# Patient Record
Sex: FEMALE | Race: ASIAN | NOT HISPANIC OR LATINO | URBAN - METROPOLITAN AREA
[De-identification: names, ages, dates, MRNs, and addresses within clinical notes are randomized per-mention and may not be internally consistent; named-entity substitution may affect disease eponyms.]

---

## 2020-02-24 ENCOUNTER — HOSPITAL ENCOUNTER (OUTPATIENT)
Dept: ULTRASOUND IMAGING | Facility: HOSPITAL | Age: 55
Discharge: HOME | End: 2020-02-24
Attending: OBSTETRICS & GYNECOLOGY

## 2024-01-15 ENCOUNTER — HOSPITAL ENCOUNTER (EMERGENCY)
Facility: HOSPITAL | Age: 59
Discharge: HOME | End: 2024-01-15
Attending: EMERGENCY MEDICINE | Admitting: EMERGENCY MEDICINE
Payer: OTHER GOVERNMENT

## 2024-01-15 ENCOUNTER — APPOINTMENT (OUTPATIENT)
Dept: GENERAL RADIOLOGY | Facility: HOSPITAL | Age: 59
End: 2024-01-15
Payer: OTHER GOVERNMENT

## 2024-01-15 ENCOUNTER — APPOINTMENT (OUTPATIENT)
Dept: CT IMAGING | Facility: HOSPITAL | Age: 59
End: 2024-01-15
Payer: OTHER GOVERNMENT

## 2024-01-15 VITALS
OXYGEN SATURATION: 98 % | SYSTOLIC BLOOD PRESSURE: 147 MMHG | RESPIRATION RATE: 16 BRPM | WEIGHT: 126.76 LBS | DIASTOLIC BLOOD PRESSURE: 110 MMHG | HEIGHT: 61 IN | TEMPERATURE: 97.2 F | BODY MASS INDEX: 23.93 KG/M2 | HEART RATE: 91 BPM

## 2024-01-15 DIAGNOSIS — R07.9 NONSPECIFIC CHEST PAIN: ICD-10-CM

## 2024-01-15 DIAGNOSIS — R06.00 DYSPNEA, UNSPECIFIED TYPE: Primary | ICD-10-CM

## 2024-01-15 LAB
ALBUMIN SERPL-MCNC: 4.5 G/DL (ref 3.5–5.2)
ALBUMIN/GLOB SERPL: 1.7 G/DL
ALP SERPL-CCNC: 94 U/L (ref 39–117)
ALT SERPL W P-5'-P-CCNC: 17 U/L (ref 1–33)
ANION GAP SERPL CALCULATED.3IONS-SCNC: 14.4 MMOL/L (ref 5–15)
AST SERPL-CCNC: 20 U/L (ref 1–32)
BASOPHILS # BLD AUTO: 0.06 10*3/MM3 (ref 0–0.2)
BASOPHILS NFR BLD AUTO: 0.9 % (ref 0–1.5)
BILIRUB SERPL-MCNC: 0.6 MG/DL (ref 0–1.2)
BUN SERPL-MCNC: 7 MG/DL (ref 6–20)
BUN/CREAT SERPL: 8.9 (ref 7–25)
CALCIUM SPEC-SCNC: 9.8 MG/DL (ref 8.6–10.5)
CHLORIDE SERPL-SCNC: 100 MMOL/L (ref 98–107)
CO2 SERPL-SCNC: 24.6 MMOL/L (ref 22–29)
CREAT SERPL-MCNC: 0.79 MG/DL (ref 0.57–1)
DEPRECATED RDW RBC AUTO: 37.9 FL (ref 37–54)
EGFRCR SERPLBLD CKD-EPI 2021: 86.3 ML/MIN/1.73
EOSINOPHIL # BLD AUTO: 0.06 10*3/MM3 (ref 0–0.4)
EOSINOPHIL NFR BLD AUTO: 0.9 % (ref 0.3–6.2)
ERYTHROCYTE [DISTWIDTH] IN BLOOD BY AUTOMATED COUNT: 12 % (ref 12.3–15.4)
FLUAV SUBTYP SPEC NAA+PROBE: NOT DETECTED
FLUBV RNA ISLT QL NAA+PROBE: NOT DETECTED
GLOBULIN UR ELPH-MCNC: 2.6 GM/DL
GLUCOSE SERPL-MCNC: 147 MG/DL (ref 65–99)
HCT VFR BLD AUTO: 43.5 % (ref 34–46.6)
HGB BLD-MCNC: 14.8 G/DL (ref 12–15.9)
HOLD SPECIMEN: NORMAL
HOLD SPECIMEN: NORMAL
IMM GRANULOCYTES # BLD AUTO: 0.01 10*3/MM3 (ref 0–0.05)
IMM GRANULOCYTES NFR BLD AUTO: 0.1 % (ref 0–0.5)
LYMPHOCYTES # BLD AUTO: 1.54 10*3/MM3 (ref 0.7–3.1)
LYMPHOCYTES NFR BLD AUTO: 22 % (ref 19.6–45.3)
MCH RBC QN AUTO: 29.4 PG (ref 26.6–33)
MCHC RBC AUTO-ENTMCNC: 34 G/DL (ref 31.5–35.7)
MCV RBC AUTO: 86.3 FL (ref 79–97)
MONOCYTES # BLD AUTO: 0.47 10*3/MM3 (ref 0.1–0.9)
MONOCYTES NFR BLD AUTO: 6.7 % (ref 5–12)
NEUTROPHILS NFR BLD AUTO: 4.87 10*3/MM3 (ref 1.7–7)
NEUTROPHILS NFR BLD AUTO: 69.4 % (ref 42.7–76)
NRBC BLD AUTO-RTO: 0 /100 WBC (ref 0–0.2)
NT-PROBNP SERPL-MCNC: 40.3 PG/ML (ref 0–900)
PLATELET # BLD AUTO: 248 10*3/MM3 (ref 140–450)
PMV BLD AUTO: 10.4 FL (ref 6–12)
POTASSIUM SERPL-SCNC: 4.1 MMOL/L (ref 3.5–5.2)
PROT SERPL-MCNC: 7.1 G/DL (ref 6–8.5)
RBC # BLD AUTO: 5.04 10*6/MM3 (ref 3.77–5.28)
RSV RNA NPH QL NAA+NON-PROBE: NOT DETECTED
SARS-COV-2 RNA RESP QL NAA+PROBE: NOT DETECTED
SODIUM SERPL-SCNC: 139 MMOL/L (ref 136–145)
TROPONIN T SERPL HS-MCNC: <6 NG/L
WBC NRBC COR # BLD AUTO: 7.01 10*3/MM3 (ref 3.4–10.8)
WHOLE BLOOD HOLD COAG: NORMAL
WHOLE BLOOD HOLD SPECIMEN: NORMAL

## 2024-01-15 PROCEDURE — 36415 COLL VENOUS BLD VENIPUNCTURE: CPT | Performed by: EMERGENCY MEDICINE

## 2024-01-15 PROCEDURE — 84484 ASSAY OF TROPONIN QUANT: CPT | Performed by: EMERGENCY MEDICINE

## 2024-01-15 PROCEDURE — 93005 ELECTROCARDIOGRAM TRACING: CPT | Performed by: EMERGENCY MEDICINE

## 2024-01-15 PROCEDURE — 99285 EMERGENCY DEPT VISIT HI MDM: CPT

## 2024-01-15 PROCEDURE — 93005 ELECTROCARDIOGRAM TRACING: CPT

## 2024-01-15 PROCEDURE — 25510000001 IOPAMIDOL PER 1 ML: Performed by: EMERGENCY MEDICINE

## 2024-01-15 PROCEDURE — 80053 COMPREHEN METABOLIC PANEL: CPT | Performed by: EMERGENCY MEDICINE

## 2024-01-15 PROCEDURE — 71260 CT THORAX DX C+: CPT

## 2024-01-15 PROCEDURE — 83880 ASSAY OF NATRIURETIC PEPTIDE: CPT | Performed by: EMERGENCY MEDICINE

## 2024-01-15 PROCEDURE — 71045 X-RAY EXAM CHEST 1 VIEW: CPT

## 2024-01-15 PROCEDURE — 87637 SARSCOV2&INF A&B&RSV AMP PRB: CPT | Performed by: EMERGENCY MEDICINE

## 2024-01-15 PROCEDURE — 85025 COMPLETE CBC W/AUTO DIFF WBC: CPT | Performed by: EMERGENCY MEDICINE

## 2024-01-15 RX ORDER — SODIUM CHLORIDE 0.9 % (FLUSH) 0.9 %
10 SYRINGE (ML) INJECTION AS NEEDED
Status: DISCONTINUED | OUTPATIENT
Start: 2024-01-15 | End: 2024-01-15 | Stop reason: HOSPADM

## 2024-01-15 RX ADMIN — IOPAMIDOL 45 ML: 755 INJECTION, SOLUTION INTRAVENOUS at 19:22

## 2024-01-15 NOTE — ED PROVIDER NOTES
"Time: 6:22 PM EST  Date of encounter:  1/15/2024  Independent Historian/Clinical History and Information was obtained by:   Patient    History is limited by: N/A    Chief Complaint: Chest pain, difficulty breathing      History of Present Illness:  Patient is a 58 y.o. year old female who presents to the emergency department for evaluation of intermittent chest pain and difficulty breathing for the past 1 year.  Patient presents today as the symptoms seem to be worse.  She describes the pain as bilateral chest \"needles\".  Complains of shortness of breath.  Afebrile.  In the last few days she has noted cough/congestion.  Former smoker.    HPI    Patient Care Team  Primary Care Provider: Provider, No Known    Past Medical History:     Allergies   Allergen Reactions    Bactrim [Sulfamethoxazole-Trimethoprim] Unknown - Low Severity    Benadryl [Diphenhydramine] Unknown - Low Severity     History reviewed. No pertinent past medical history.  History reviewed. No pertinent surgical history.  History reviewed. No pertinent family history.    Home Medications:  Prior to Admission medications    Not on File        Social History:          Review of Systems:  Review of Systems   Constitutional:  Negative for chills and fever.   HENT:  Negative for congestion, rhinorrhea and sore throat.    Eyes:  Negative for photophobia.   Respiratory:  Positive for shortness of breath. Negative for apnea, cough and chest tightness.    Cardiovascular:  Positive for chest pain. Negative for palpitations.   Gastrointestinal:  Negative for abdominal pain, diarrhea, nausea and vomiting.   Endocrine: Negative.    Genitourinary:  Negative for difficulty urinating and dysuria.   Musculoskeletal:  Negative for back pain, joint swelling and myalgias.   Skin:  Negative for color change and wound.   Allergic/Immunologic: Negative.    Neurological:  Negative for seizures and headaches.   Psychiatric/Behavioral: Negative.     All other systems reviewed " "and are negative.       Physical Exam:  BP (!) 148/103 (BP Location: Left arm, Patient Position: Sitting)   Pulse 91   Temp 97.2 °F (36.2 °C) (Oral)   Resp 18   Ht 154.9 cm (61\")   Wt 57.5 kg (126 lb 12.2 oz)   LMP  (LMP Unknown)   SpO2 96%   BMI 23.95 kg/m²     Physical Exam  Vitals and nursing note reviewed.   Constitutional:       General: She is awake.      Appearance: Normal appearance. She is well-developed.   HENT:      Head: Normocephalic and atraumatic.      Nose: Nose normal.      Mouth/Throat:      Mouth: Mucous membranes are moist.   Eyes:      Extraocular Movements: Extraocular movements intact.      Pupils: Pupils are equal, round, and reactive to light.   Cardiovascular:      Rate and Rhythm: Normal rate and regular rhythm.      Heart sounds: Normal heart sounds.   Pulmonary:      Effort: Pulmonary effort is normal. No respiratory distress.      Breath sounds: Normal breath sounds. No wheezing, rhonchi or rales.   Abdominal:      General: Bowel sounds are normal.      Palpations: Abdomen is soft.      Tenderness: There is no abdominal tenderness. There is no guarding or rebound.      Comments: No rigidity   Musculoskeletal:         General: No tenderness. Normal range of motion.      Cervical back: Normal range of motion and neck supple.   Skin:     General: Skin is warm and dry.      Coloration: Skin is not jaundiced.   Neurological:      General: No focal deficit present.      Mental Status: She is alert. Mental status is at baseline.   Psychiatric:         Mood and Affect: Mood normal.                  Procedures:  Procedures      Medical Decision Making:      Comorbidities that affect care:    None    External Notes reviewed:    None      The following orders were placed and all results were independently analyzed by me:  Orders Placed This Encounter   Procedures    COVID-19, FLU A/B, RSV PCR 1 HR TAT - Swab, Nasopharynx    XR Chest 1 View    CT Chest With Contrast Diagnostic    [place] " Draw    Comprehensive Metabolic Panel    BNP    Single High Sensitivity Troponin T    CBC Auto Differential    Ambulatory Referral to Family Practice    NPO Diet NPO Type: Strict NPO    Undress & Gown    Continuous Pulse Oximetry    Vital Signs    Oxygen Therapy- Nasal Cannula; Titrate 1-6 LPM Per SpO2; 90 - 95%    ECG 12 Lead ED Triage Standing Order; SOA    Insert Peripheral IV    CBC & Differential    Green Top (Gel)    Lavender Top    Gold Top - SST    Light Blue Top       Medications Given in the Emergency Department:  Medications   sodium chloride 0.9 % flush 10 mL (has no administration in time range)   iopamidol (ISOVUE-370) 76 % injection 100 mL (45 mL Intravenous Given 1/15/24 1922)        ED Course:    ED Course as of 01/15/24 2047   Mon Che[place] 15, 2024   1820 I have personally interpreted the EKG today and it shows no evidence of any acute ischemia or heart arrhythmia. [RP]      ED Course User Index  [RP] Wesley Pickering MD       Labs:    Lab Results (last 24 hours)       Procedure Component Value Units Date/Time    COVID-19, FLU A/B, RSV PCR 1 HR TAT - Swab, Nasopharynx [[id number]]  (Normal) Collected: 01/15/24 1657    Specimen: Swab from Nasopharynx Updated: 01/15/24 1804     COVID19 Not Detected     Influenza A PCR Not Detected     Influenza B PCR Not Detected     RSV, PCR Not Detected    Narrative:      Fact sheet for providers: https://www.fda.gov/media/037016/download    Fact sheet for patients: https://www.fda.gov/media/721222/download    Test performed by PCR.    CBC & Differential [[id number]]  (Abnormal) Collected: 01/15/24 1701    Specimen: Blood from Arm, Right Updated: 01/15/24 1728    Narrative:      The following orders were created for panel order CBC & Differential.  Procedure                               Abnormality         Status                     ---------                               -----------         ------                     CBC Auto Differential[[id number]]        Abnormal             Final result                 Please view results for these tests on the individual orders.    Comprehensive Metabolic Panel [[id number]]  (Abnormal) Collected: 01/15/24 1701    Specimen: Blood from Arm, Right Updated: 01/15/24 1757     Glucose 147 mg/dL      BUN 7 mg/dL      Creatinine 0.79 mg/dL      Sodium 139 mmol/L      Potassium 4.1 mmol/L      Chloride 100 mmol/L      CO2 24.6 mmol/L      Calcium 9.8 mg/dL      Total Protein 7.1 g/dL      Albumin 4.5 g/dL      ALT (SGPT) 17 U/L      AST (SGOT) 20 U/L      Alkaline Phosphatase 94 U/L      Total Bilirubin 0.6 mg/dL      Globulin 2.6 gm/dL      A/G Ratio 1.7 g/dL      BUN/Creatinine Ratio 8.9     Anion Gap 14.4 mmol/L      eGFR 86.3 mL/min/1.73     Narrative:      GFR Normal >60  Chronic Kidney Disease <60  Kidney Failure <15      BNP [[id number]]  (Normal) Collected: 01/15/24 1701    Specimen: Blood from Arm, Right Updated: 01/15/24 1740     proBNP 40.3 pg/mL     Narrative:      This assay is used as an aid in the diagnosis of individuals suspected of having heart failure. It can be used as an aid in the diagnosis of acute decompensated heart failure (ADHF) in patients presenting with signs and symptoms of ADHF to the emergency department (ED). In addition, NT-proBNP of <300 pg/mL indicates ADHF is not likely.    Age Range Result Interpretation  NT-proBNP Concentration (pg/mL:      <50             Positive            >450                   Gray                 300-450                    Negative             <300    50-75           Positive            >900                  Gray                300-900                  Negative            <300      >75             Positive            >1800                  Gray                300-1800                  Negative            <300    Single High Sensitivity Troponin T [[id number]]  (Normal) Collected: 01/15/24 1701    Specimen: Blood from Arm, Right Updated: 01/15/24 1745     HS Troponin T <6 ng/L     Narrative:       High Sensitive Troponin T Reference Range:  <14.0 ng/L- Negative Female for AMI  <22.0 ng/L- Negative Male for AMI  >=14 - Abnormal Female indicating possible myocardial injury.  >=22 - Abnormal Male indicating possible myocardial injury.   Clinicians would have to utilize clinical acumen, EKG, Troponin, and serial changes to determine if it is an Acute Myocardial Infarction or myocardial injury due to an underlying chronic condition.         CBC Auto Differential [[id number]]  (Abnormal) Collected: 01/15/24 1701    Specimen: Blood from Arm, Right Updated: 01/15/24 1728     WBC 7.01 10*3/mm3      RBC 5.04 10*6/mm3      Hemoglobin 14.8 g/dL      Hematocrit 43.5 %      MCV 86.3 fL      MCH 29.4 pg      MCHC 34.0 g/dL      RDW 12.0 %      RDW-SD 37.9 fl      MPV 10.4 fL      Platelets 248 10*3/mm3      Neutrophil % 69.4 %      Lymphocyte % 22.0 %      Monocyte % 6.7 %      Eosinophil % 0.9 %      Basophil % 0.9 %      Immature Grans % 0.1 %      Neutrophils, Absolute 4.87 10*3/mm3      Lymphocytes, Absolute 1.54 10*3/mm3      Monocytes, Absolute 0.47 10*3/mm3      Eosinophils, Absolute 0.06 10*3/mm3      Basophils, Absolute 0.06 10*3/mm3      Immature Grans, Absolute 0.01 10*3/mm3      nRBC 0.0 /100 WBC              Imaging:    CT Chest With Contrast Diagnostic    Result Date: 1/15/2024  PROCEDURE: CT CHEST W CONTRAST DIAGNOSTIC  COMPARISON:  [facility], CT, ABDOMEN/PELVIS WITH CONTRAST, 1/09/2020, 2:59. INDICATIONS: PE protocol. Chest pain, SOA  TECHNIQUE: After obtaining the patient's consent, CT images were obtained with non-ionic intravenous contrast material.   PROTOCOL:   Standard imaging protocol performed    RADIATION:   DLP: 259.5mGy*cm   Automated exposure control was utilized to minimize radiation dose. CONTRAST: 45cc Isovue 370 I.V.  FINDINGS:  Pulmonary arteries:  Adequately opacified.  No emboli demonstrated  Candace/mediastinum:  Thoracic aorta normal in caliber.  Minimal coronary  calcification.  No pericardial effusion  Lungs/pleura:  No acute infiltrate or edema.  Mild atelectasis in the lung bases.  No pleural effusion  Upper abdomen:  No acute abnormality.  Hepatic and left renal cysts  Bones/soft tissues:  No acute bony abnormality       No evidence of pulmonary embolism.  No acute cardiopulmonary process demonstrated     GWENDOLYN MATA MD       Electronically Signed and Approved By: GWENDOLYN MATA MD on 1/15/2024 at 19:33             XR Chest 1 View    Result Date: 1/15/2024  PROCEDURE: XR CHEST 1 VW  COMPARISON: [facility], , CHEST AP/PA 1 VIEW, 1/09/2020, 0:49.  INDICATIONS: SOA Triage Protocol  non smoker short of breath  FINDINGS:  No focal or diffuse infiltrate is identified. No pleural effusion or pneumothorax. Heart size and mediastinal contour appear within normal limits.         No radiographic findings of acute cardiopulmonary abnormality.       LOC VELEZ MD       Electronically Signed and Approved By: LOC VELEZ MD on 1/15/2024 at 17:38                Differential Diagnosis and Discussion:    Chest Pain:  Based on the patient's signs and symptoms, I considered aortic dissection, myocardial infaction, pulmonary embolism, cardiac tamponade, pericarditis, pneumothorax, musculoskeletal chest pain and other differential diagnosis as an etiology of the patient's chest pain.   Dyspnea: Differential diagnosis includes but is not limited to metabolic acidosis, neurological disorders, psychogenic, asthma, pneumothorax, upper airway obstruction, COPD, pneumonia, noncardiogenic pulmonary edema, interstitial lung disease, anemia, congestive heart failure, and pulmonary embolism    All labs were reviewed and interpreted by me.  All X-rays impressions were independently interpreted by me.  EKG was interpreted by me.  CT scan radiology impression was interpreted by me.    MDM     Amount and/or Complexity of Data Reviewed  Clinical lab tests: reviewed  Tests in the  radiology section of CPT®: reviewed  Tests in the medicine section of CPT®: reviewed                 Patient Care Considerations:    CONSULT: I considered consulting cardiology, however EKG is nonischemic and troponin is negative      Consultants/Shared Management Plan:    None    Social Determinants of Health:    Patient is independent, reliable, and has access to care.       Disposition and Care Coordination:    Discharged: I considered escalation of care by admitting this patient to the hospital, however the patient has improved and is suitable and stable for discharge.    I have explained the patient´s condition, diagnoses and treatment plan based on the information available to me at this time. I have answered questions and addressed any concerns. The patient has a good  understanding of the patient´s diagnosis, condition, and treatment plan as can be expected at this point. The vital signs have been stable. The patient´s condition is stable and appropriate for discharge from the emergency department.      The patient will pursue further outpatient evaluation with the primary care physician or other designated or consulting physician as outlined in the discharge instructions. They are agreeable to this plan of care and follow-up instructions have been explained in detail. The patient has received these instructions in written format and have expressed an understanding of the discharge instructions. The patient is aware that any significant change in condition or worsening of symptoms should prompt an immediate return to this or the closest emergency department or call to 911.    Final diagnoses:   Dyspnea, unspecified type   Nonspecific chest pain        ED Disposition       ED Disposition   Discharge    Condition   Stable    Comment   --               This medical record created using voice recognition software.             Wesley Pickering MD  01/15/24 1545

## 2024-01-16 LAB
QT INTERVAL: 364 MS
QTC INTERVAL: 444 MS

## 2024-11-28 ENCOUNTER — HOSPITAL ENCOUNTER (EMERGENCY)
Facility: HOSPITAL | Age: 59
Discharge: HOME | End: 2024-11-28
Attending: EMERGENCY MEDICINE
Payer: OTHER GOVERNMENT

## 2024-11-28 ENCOUNTER — APPOINTMENT (OUTPATIENT)
Dept: GENERAL RADIOLOGY | Facility: HOSPITAL | Age: 59
End: 2024-11-28
Payer: OTHER GOVERNMENT

## 2024-11-28 VITALS
TEMPERATURE: 98.1 F | OXYGEN SATURATION: 99 % | BODY MASS INDEX: 20.73 KG/M2 | RESPIRATION RATE: 16 BRPM | HEIGHT: 60 IN | WEIGHT: 105.6 LBS | HEART RATE: 84 BPM | SYSTOLIC BLOOD PRESSURE: 149 MMHG | DIASTOLIC BLOOD PRESSURE: 95 MMHG

## 2024-11-28 DIAGNOSIS — R09.A2 GLOBUS SENSATION: Primary | ICD-10-CM

## 2024-11-28 LAB
HOLD SPECIMEN: NORMAL
HOLD SPECIMEN: NORMAL
WHOLE BLOOD HOLD COAG: NORMAL
WHOLE BLOOD HOLD SPECIMEN: NORMAL

## 2024-11-28 PROCEDURE — 99283 EMERGENCY DEPT VISIT LOW MDM: CPT

## 2024-11-28 PROCEDURE — 96374 THER/PROPH/DIAG INJ IV PUSH: CPT

## 2024-11-28 PROCEDURE — 25010000002 GLUCAGON (RDNA) PER 1 MG

## 2024-11-28 PROCEDURE — 70360 X-RAY EXAM OF NECK: CPT

## 2024-11-28 RX ORDER — SODIUM CHLORIDE 0.9 % (FLUSH) 0.9 %
10 SYRINGE (ML) INJECTION AS NEEDED
Status: DISCONTINUED | OUTPATIENT
Start: 2024-11-28 | End: 2024-11-28 | Stop reason: HOSPADM

## 2024-11-28 RX ORDER — IBUPROFEN 600 MG/1
1 TABLET ORAL ONCE
Status: COMPLETED | OUTPATIENT
Start: 2024-11-28 | End: 2024-11-28

## 2024-11-28 RX ADMIN — Medication 1 MG: at 17:40

## 2024-11-28 NOTE — DISCHARGE INSTRUCTIONS
X-rays negative  You are able to continue eating and drinking without vomiting, this means that there is not an obstruction.     I have put a referral for GI.  Their office will call you to schedule follow-up appointment

## 2024-11-28 NOTE — ED PROVIDER NOTES
"Time: 5:28 PM EST  Date of encounter:  11/28/2024  Independent Historian/Clinical History and Information was obtained by:   Patient    History is limited by: N/A    Chief Complaint   Patient presents with    FOOD BOLUS         History of Present Illness:  Patient is a 59 y.o. year old female who presents to the emergency department for evaluation of like something is stuck in her throat.  Patient states she was eating some vegetables during Thanksgiving dinner and feels like it might possibly be stuck on the left side of her throat.  She was able to finish the meal and drink fluids without vomiting.  She tried to make herself vomit but states she was unsuccessful.  Denies difficulty swallowing, nausea, vomiting or shortness of breath.    Patient Care Team  Primary Care Provider: Provider, No Known    Past Medical History:     Allergies   Allergen Reactions    Bactrim [Sulfamethoxazole-Trimethoprim] Unknown - Low Severity    Benadryl [Diphenhydramine] Unknown - Low Severity     History reviewed. No pertinent past medical history.  History reviewed. No pertinent surgical history.  History reviewed. No pertinent family history.    Home Medications:  Prior to Admission medications    Not on File        Social History:          Review of Systems:  Review of Systems   Constitutional: Negative.    HENT: Negative.  Negative for sore throat and trouble swallowing.    Eyes: Negative.    Respiratory: Negative.  Negative for shortness of breath and stridor.    Cardiovascular: Negative.    Gastrointestinal: Negative.    Endocrine: Negative.    Genitourinary: Negative.    Musculoskeletal: Negative.    Skin: Negative.    Allergic/Immunologic: Negative.    Neurological: Negative.    Hematological: Negative.    Psychiatric/Behavioral: Negative.          Physical Exam:  /95   Pulse 84   Temp 98.1 °F (36.7 °C) (Oral)   Resp 16   Ht 152.4 cm (60\")   Wt 47.9 kg (105 lb 9.6 oz)   SpO2 99%   BMI 20.62 kg/m²         Physical " Exam  Vitals and nursing note reviewed.   Constitutional:       Appearance: Normal appearance.   HENT:      Head: Normocephalic and atraumatic.      Nose: Nose normal.      Mouth/Throat:      Mouth: Mucous membranes are moist.      Pharynx: No oropharyngeal exudate or posterior oropharyngeal erythema.   Eyes:      Extraocular Movements: Extraocular movements intact.      Conjunctiva/sclera: Conjunctivae normal.      Pupils: Pupils are equal, round, and reactive to light.   Cardiovascular:      Rate and Rhythm: Normal rate and regular rhythm.      Heart sounds: Normal heart sounds.   Pulmonary:      Effort: Pulmonary effort is normal.      Breath sounds: Normal breath sounds.   Musculoskeletal:         General: Normal range of motion.      Cervical back: Normal range of motion and neck supple.   Skin:     General: Skin is warm and dry.   Neurological:      General: No focal deficit present.      Mental Status: She is alert and oriented to person, place, and time.   Psychiatric:         Mood and Affect: Mood normal.         Behavior: Behavior normal.                  Procedures:  Procedures      Medical Decision Making:      Comorbidities that affect care:    None    External Notes reviewed:    Previous ED Note: [facility] ED visit 1/15/2024      The following orders were placed and all results were independently analyzed by me:  Orders Placed This Encounter   Procedures    XR Neck Soft Tissue    [place] Draw    Ambulatory Referral to Gastroenterology    Insert peripheral IV    Green Top (Gel)    Lavender Top    Gold Top - SST    Light Blue Top       Medications Given in the Emergency Department:  Medications   sodium chloride 0.9 % flush 10 mL (has no administration in time range)   glucagon (GLUCAGEN) injection 1 mg (1 mg Intravenous Given 11/28/24 1740)        ED Course:    The patient was initially evaluated in the triage area where orders were placed. The patient was later dispositioned by Ayaka Vergara  NOAH.      The patient was advised to stay for completion of workup which includes but is not limited to communication of labs and radiological results, reassessment and plan. The patient was advised that leaving prior to disposition by a provider could result in critical findings that are not communicated to the patient.     ED Course as of 11/28/24 1831   Thu Nov 28, 2024   1828 X-ray negative  Patient drink a whole cup of water with no nausea or vomiting.  Will give her referral to GI for EGD. [AJ]      ED Course User Index  [AJ] Ayaka Vergara PA-C       Labs:    Lab Results (last 24 hours)       ** No results found for the last 24 hours. **             Imaging:    XR Neck Soft Tissue    Result Date: 11/28/2024  XR NECK SOFT TISSUE Date of Exam: 11/28/2024 5:45 PM EST Indication: feels like something in throat Comparison: None available. Findings: There is no evidence of tonsillar hypertrophy. No evidence of retained foreign body within the airway proximal esophagus. No prevertebral soft tissue edema. Straightening of cervical lordosis with mild degenerative changes. No acute bone abnormality. Dental amalgam.     Impression: No radiographic evidence of retained foreign body within the airway or proximal esophagus. Electronically Signed: Immanuel Murcia MD  11/28/2024 6:00 PM EST  Workstation ID: [id number]       Differential Diagnosis and Discussion:      Dysphagia  Sore Throat: Differential diagnosis includes but is not limited to bacterial infection, viral infection, inhaled irritants, sinus drainage, thyroiditis, epiglottitis, and retropharyngeal abscess.    All X-rays impressions were independently interpreted by me.    MDM                   Patient Care Considerations:    CONSULT: I considered consulting GI, however patient can continue to eat and drink without vomiting      Consultants/Shared Management Plan:    None    Social Determinants of Health:    Patient is independent, reliable, and has access to  care.       Disposition and Care Coordination:    Discharged: The patient is suitable and stable for discharge with no need for consideration of admission.    I have explained the patient´s condition, diagnoses and treatment plan based on the information available to me at this time. I have answered questions and addressed any concerns. The patient has a good  understanding of the patient´s diagnosis, condition, and treatment plan as can be expected at this point. The vital signs have been stable. The patient´s condition is stable and appropriate for discharge from the emergency department.      The patient will pursue further outpatient evaluation with the primary care physician or other designated or consulting physician as outlined in the discharge instructions. They are agreeable to this plan of care and follow-up instructions have been explained in detail. The patient has received these instructions in written format and has expressed an understanding of the discharge instructions. The patient is aware that any significant change in condition or worsening of symptoms should prompt an immediate return to this or the closest emergency department or call to 911.    Final diagnoses:   Globus sensation        ED Disposition       ED Disposition   Discharge    Condition   Stable    Comment   --               This medical record created using voice recognition software.             Ayaka Vergara PA-C  11/28/24 4882

## 2025-01-10 ENCOUNTER — OFFICE VISIT (OUTPATIENT)
Dept: FAMILY MEDICINE CLINIC | Facility: CLINIC | Age: 60
End: 2025-01-10
Payer: OTHER GOVERNMENT

## 2025-01-10 VITALS
HEART RATE: 80 BPM | WEIGHT: 111 LBS | TEMPERATURE: 97.3 F | OXYGEN SATURATION: 98 % | BODY MASS INDEX: 21.79 KG/M2 | HEIGHT: 60 IN

## 2025-01-10 DIAGNOSIS — Z12.31 SCREENING MAMMOGRAM FOR BREAST CANCER: ICD-10-CM

## 2025-01-10 DIAGNOSIS — Z23 NEED FOR TDAP VACCINATION: ICD-10-CM

## 2025-01-10 DIAGNOSIS — R09.A2 GLOBUS SENSATION: ICD-10-CM

## 2025-01-10 DIAGNOSIS — Z00.00 ENCOUNTER FOR MEDICAL EXAMINATION TO ESTABLISH CARE: Primary | ICD-10-CM

## 2025-01-10 DIAGNOSIS — I10 HYPERTENSION, UNSPECIFIED TYPE: ICD-10-CM

## 2025-01-10 DIAGNOSIS — M19.041 PRIMARY OSTEOARTHRITIS OF RIGHT HAND: ICD-10-CM

## 2025-01-10 PROCEDURE — 99204 OFFICE O/P NEW MOD 45 MIN: CPT | Performed by: STUDENT IN AN ORGANIZED HEALTH CARE EDUCATION/TRAINING PROGRAM

## 2025-01-10 RX ORDER — AMLODIPINE BESYLATE 5 MG/1
5 TABLET ORAL DAILY
Qty: 30 TABLET | Refills: 5 | Status: SHIPPED | OUTPATIENT
Start: 2025-01-10

## 2025-01-10 NOTE — PROGRESS NOTES
"Chief Complaint  Establish Care and Hospital Follow Up Visit (11/28/24 choked on vegetable at thanksgiving)    Arun Seo presents to [facility]  History of Present Illness      History of Present Illness  The patient presents for evaluation of globus sensation, hypertension, arthritis, and health maintenance.    She experienced a single episode of globus sensation while consuming vegetables on Thanksgiving, which necessitated an emergency room visit. She reports no current issues with food impaction during meals. The incident involved an object lodged in her throat, causing a laceration that she was unable to dislodge. This resulted in a temporary inability to breathe due to the tightness, which eventually resolved. She has no history of tonsillectomy or any other surgical procedures. A referral to a gastroenterologist was made by the emergency room physician, but she has not yet scheduled an appointment.    She is not currently on any antihypertensive medications. She reports no headaches or chest pain.    She has a history of smoking for 25 years, which she ceased in 2000. Her last mammogram was conducted during her  service, and she has not had one in the past two years. A benign finding was noted in her right breast. She has not received a tetanus vaccine.    She reports intermittent sharp pain in her fingers.    SOCIAL HISTORY  The patient has a 25-year history of smoking and quit approximately 25 years ago.    IMMUNIZATIONS  She is due for a tetanus vaccine.      No current outpatient medications    The following portions of the patient's history were reviewed and updated as appropriate: allergies, current medications, past family history, past medical history, past social history, past surgical history, and problem list.    Objective   Vital Signs:   Pulse 80   Temp 97.3 °F (36.3 °C)   Ht 152.4 cm (60\")   Wt 50.3 kg (111 lb)   SpO2 98%   " BMI 21.68 kg/m²     BP Readings from Last 3 Encounters:   11/28/24 149/95   01/15/24 (!) 147/110     Wt Readings from Last 3 Encounters:   01/10/25 50.3 kg (111 lb)   11/28/24 47.9 kg (105 lb 9.6 oz)   01/15/24 57.5 kg (126 lb 12.2 oz)     BMI is within normal parameters. No other follow-up for BMI required.     Physical Exam  Constitutional:       Appearance: Normal appearance.   HENT:      Head: Normocephalic.      Nose: Nose normal.      Mouth/Throat:      Mouth: Mucous membranes are moist.   Eyes:      Pupils: Pupils are equal, round, and reactive to light.   Cardiovascular:      Rate and Rhythm: Normal rate and regular rhythm.   Pulmonary:      Effort: Pulmonary effort is normal.   Abdominal:      General: Abdomen is flat.   Musculoskeletal:         General: Normal range of motion.      Cervical back: Normal range of motion.   Skin:     General: Skin is warm and dry.   Neurological:      General: No focal deficit present.      Mental Status: She is alert.   Psychiatric:         Mood and Affect: Mood normal.         Thought Content: Thought content normal.            Result Review :   The following data was reviewed by: Gina Hargrove MD on 01/10/2025:           Lab Results   Component Value Date    COVID19 Not Detected 01/15/2024    RSV Not Detected 01/15/2024       Procedures        Assessment and Plan    There are no diagnoses linked to this encounter.      Assessment & Plan  1. Globus sensation.  She reported a sensation of something stuck in her throat after eating vegetables, which led to an ER visit. The sensation has since resolved. No imaging was done in the ER. A referral to a gastroenterologist will be initiated for further evaluation and to potentially schedule an EGD.    2. Hypertension.  Her blood pressure readings have consistently been elevated, both during her recent ER visit and prior to that. A low-dose antihypertensive medication will be prescribed and sent to [facility]. She is advised to  acquire a home blood pressure monitor and record her readings daily before taking her medication. These readings should be documented for review during her next visit.    3. Arthritis.  She has Heberden's nodes on her fingers, indicative of arthritis. She experiences occasional sharp pain. An as-needed medication for pain management will be prescribed and sent to [facility].    4. Health maintenance.  A mammogram will be ordered, as she has not had one in the last two years. She is advised to get a tetanus shot during her next visit.    Follow-up  The patient will follow up in 1 month for lab work and blood pressure assessment.      There are no discontinued medications.       Follow Up   No follow-ups on file.  Patient was given instructions and counseling regarding her condition or for health maintenance advice. Please see specific information pulled into the AVS if appropriate.       Gina Hargrove MD  01/10/25  08:06 EST      Patient or patient representative verbalized consent for the use of Ambient Listening during the visit with  Gina Hargrove MD for chart documentation. 1/10/2025  08:07 EST

## 2025-02-10 DIAGNOSIS — M19.041 PRIMARY OSTEOARTHRITIS OF RIGHT HAND: ICD-10-CM

## 2025-02-10 NOTE — TELEPHONE ENCOUNTER
Rx Refill Note  Requested Prescriptions     Pending Prescriptions Disp Refills    diclofenac (VOLTAREN) 50 MG EC tablet 60 tablet 0     Sig: Take 1 tablet by mouth 2 (Two) Times a Day As Needed (as neded for pain) for up to 30 days.      Last office visit with prescribing clinician: 1/10/2025   Last telemedicine visit with prescribing clinician: Visit date not found   Next office visit with prescribing clinician: 2/13/2025                         Would you like a call back once the refill request has been completed: [] Yes [] No    If the office needs to give you a call back, can they leave a voicemail: [] Yes [] No    Margaret Sorensen Rep  02/10/25, 11:03 EST

## 2025-02-13 ENCOUNTER — OFFICE VISIT (OUTPATIENT)
Dept: FAMILY MEDICINE CLINIC | Facility: CLINIC | Age: 60
End: 2025-02-13
Payer: OTHER GOVERNMENT

## 2025-02-13 VITALS
OXYGEN SATURATION: 98 % | HEART RATE: 91 BPM | HEIGHT: 60 IN | SYSTOLIC BLOOD PRESSURE: 130 MMHG | DIASTOLIC BLOOD PRESSURE: 78 MMHG | WEIGHT: 107 LBS | BODY MASS INDEX: 21.01 KG/M2 | TEMPERATURE: 97 F

## 2025-02-13 DIAGNOSIS — Z00.00 ANNUAL PHYSICAL EXAM: Primary | ICD-10-CM

## 2025-02-13 DIAGNOSIS — Z23 NEED FOR VACCINATION WITH 20-POLYVALENT PNEUMOCOCCAL CONJUGATE VACCINE: ICD-10-CM

## 2025-02-13 DIAGNOSIS — Z11.59 NEED FOR HEPATITIS C SCREENING TEST: ICD-10-CM

## 2025-02-13 DIAGNOSIS — Z23 NEED FOR VACCINATION: ICD-10-CM

## 2025-02-13 DIAGNOSIS — Z12.11 COLON CANCER SCREENING: ICD-10-CM

## 2025-02-13 DIAGNOSIS — I10 HYPERTENSION, UNSPECIFIED TYPE: ICD-10-CM

## 2025-02-13 DIAGNOSIS — Z23 NEED FOR TDAP VACCINATION: ICD-10-CM

## 2025-02-13 LAB
ALBUMIN SERPL-MCNC: 4.1 G/DL (ref 3.5–5.2)
ALBUMIN/GLOB SERPL: 1.6 G/DL
ALP SERPL-CCNC: 73 U/L (ref 39–117)
ALT SERPL W P-5'-P-CCNC: 13 U/L (ref 1–33)
ANION GAP SERPL CALCULATED.3IONS-SCNC: 11.7 MMOL/L (ref 5–15)
AST SERPL-CCNC: 21 U/L (ref 1–32)
BASOPHILS # BLD AUTO: 0.05 10*3/MM3 (ref 0–0.2)
BASOPHILS NFR BLD AUTO: 1.1 % (ref 0–1.5)
BILIRUB SERPL-MCNC: 0.5 MG/DL (ref 0–1.2)
BUN SERPL-MCNC: 14 MG/DL (ref 8–23)
BUN/CREAT SERPL: 17.7 (ref 7–25)
CALCIUM SPEC-SCNC: 9.4 MG/DL (ref 8.6–10.5)
CHLORIDE SERPL-SCNC: 101 MMOL/L (ref 98–107)
CHOLEST SERPL-MCNC: 248 MG/DL (ref 0–200)
CO2 SERPL-SCNC: 24.3 MMOL/L (ref 22–29)
CREAT SERPL-MCNC: 0.79 MG/DL (ref 0.57–1)
DEPRECATED RDW RBC AUTO: 37.3 FL (ref 37–54)
EGFRCR SERPLBLD CKD-EPI 2021: 85.8 ML/MIN/1.73
EOSINOPHIL # BLD AUTO: 0.07 10*3/MM3 (ref 0–0.4)
EOSINOPHIL NFR BLD AUTO: 1.6 % (ref 0.3–6.2)
ERYTHROCYTE [DISTWIDTH] IN BLOOD BY AUTOMATED COUNT: 11.8 % (ref 12.3–15.4)
GLOBULIN UR ELPH-MCNC: 2.5 GM/DL
GLUCOSE SERPL-MCNC: 239 MG/DL (ref 65–99)
HCT VFR BLD AUTO: 42.1 % (ref 34–46.6)
HDLC SERPL-MCNC: 60 MG/DL (ref 40–60)
HGB BLD-MCNC: 14.3 G/DL (ref 12–15.9)
IMM GRANULOCYTES # BLD AUTO: 0.01 10*3/MM3 (ref 0–0.05)
IMM GRANULOCYTES NFR BLD AUTO: 0.2 % (ref 0–0.5)
LDLC SERPL CALC-MCNC: 150 MG/DL (ref 0–100)
LDLC/HDLC SERPL: 2.43 {RATIO}
LYMPHOCYTES # BLD AUTO: 1.73 10*3/MM3 (ref 0.7–3.1)
LYMPHOCYTES NFR BLD AUTO: 38.5 % (ref 19.6–45.3)
MCH RBC QN AUTO: 29.8 PG (ref 26.6–33)
MCHC RBC AUTO-ENTMCNC: 34 G/DL (ref 31.5–35.7)
MCV RBC AUTO: 87.7 FL (ref 79–97)
MONOCYTES # BLD AUTO: 0.29 10*3/MM3 (ref 0.1–0.9)
MONOCYTES NFR BLD AUTO: 6.5 % (ref 5–12)
NEUTROPHILS NFR BLD AUTO: 2.34 10*3/MM3 (ref 1.7–7)
NEUTROPHILS NFR BLD AUTO: 52.1 % (ref 42.7–76)
NRBC BLD AUTO-RTO: 0 /100 WBC (ref 0–0.2)
PLATELET # BLD AUTO: 246 10*3/MM3 (ref 140–450)
PMV BLD AUTO: 11.2 FL (ref 6–12)
POTASSIUM SERPL-SCNC: 3.8 MMOL/L (ref 3.5–5.2)
PROT SERPL-MCNC: 6.6 G/DL (ref 6–8.5)
RBC # BLD AUTO: 4.8 10*6/MM3 (ref 3.77–5.28)
SODIUM SERPL-SCNC: 137 MMOL/L (ref 136–145)
TRIGL SERPL-MCNC: 210 MG/DL (ref 0–150)
TSH SERPL DL<=0.05 MIU/L-ACNC: 1.17 UIU/ML (ref 0.27–4.2)
VLDLC SERPL-MCNC: 38 MG/DL (ref 5–40)
WBC NRBC COR # BLD AUTO: 4.49 10*3/MM3 (ref 3.4–10.8)

## 2025-02-13 PROCEDURE — 99396 PREV VISIT EST AGE 40-64: CPT | Performed by: STUDENT IN AN ORGANIZED HEALTH CARE EDUCATION/TRAINING PROGRAM

## 2025-02-13 PROCEDURE — 36415 COLL VENOUS BLD VENIPUNCTURE: CPT | Performed by: STUDENT IN AN ORGANIZED HEALTH CARE EDUCATION/TRAINING PROGRAM

## 2025-02-13 PROCEDURE — 80053 COMPREHEN METABOLIC PANEL: CPT | Performed by: STUDENT IN AN ORGANIZED HEALTH CARE EDUCATION/TRAINING PROGRAM

## 2025-02-13 PROCEDURE — 90471 IMMUNIZATION ADMIN: CPT | Performed by: STUDENT IN AN ORGANIZED HEALTH CARE EDUCATION/TRAINING PROGRAM

## 2025-02-13 PROCEDURE — 85025 COMPLETE CBC W/AUTO DIFF WBC: CPT | Performed by: STUDENT IN AN ORGANIZED HEALTH CARE EDUCATION/TRAINING PROGRAM

## 2025-02-13 PROCEDURE — 90472 IMMUNIZATION ADMIN EACH ADD: CPT | Performed by: STUDENT IN AN ORGANIZED HEALTH CARE EDUCATION/TRAINING PROGRAM

## 2025-02-13 PROCEDURE — 84443 ASSAY THYROID STIM HORMONE: CPT | Performed by: STUDENT IN AN ORGANIZED HEALTH CARE EDUCATION/TRAINING PROGRAM

## 2025-02-13 PROCEDURE — 90715 TDAP VACCINE 7 YRS/> IM: CPT | Performed by: STUDENT IN AN ORGANIZED HEALTH CARE EDUCATION/TRAINING PROGRAM

## 2025-02-13 PROCEDURE — 80061 LIPID PANEL: CPT | Performed by: STUDENT IN AN ORGANIZED HEALTH CARE EDUCATION/TRAINING PROGRAM

## 2025-02-13 PROCEDURE — 90677 PCV20 VACCINE IM: CPT | Performed by: STUDENT IN AN ORGANIZED HEALTH CARE EDUCATION/TRAINING PROGRAM

## 2025-02-13 NOTE — PROGRESS NOTES
Chief Complaint  Annual Exam    Arun Seo presents to [facility]  Hypertension  This is a new problem. The current episode started 1 to 4 weeks ago. The problem has been improved since onset. The problem is controlled. Pertinent negatives include no anxiety, blurred vision, chest pain, headaches, malaise/fatigue, orthopnea, palpitations, peripheral edema or shortness of breath. There are no associated agents to hypertension. There are no known risk factors for coronary artery disease. There are no compliance problems.          History of Present Illness  The patient presents for an annual physical exam.    She has been diligently monitoring her blood pressure as per the previous instructions. She has observed a correlation between her dietary intake, particularly salt, and her blood pressure readings. She reports no current pain or discomfort. Her medication regimen includes amlodipine for hypertension management.    She has expressed a preference for colonoscopy over Cologuard for colon cancer screening, citing her lack of prior experience with the former. She has a scheduled mammogram appointment on 03/03/2025.    She has received both the pneumonia and tetanus vaccines today.    Supplemental Information  She is on Voltaren for arthritis pain, which she takes as needed.    SOCIAL HISTORY  The patient smoked for about 4 to 5 months approximately 40 years ago.    MEDICATIONS  Amlodipine, Voltaren (as needed).    IMMUNIZATIONS  She received the pneumonia vaccine and the tetanus vaccine today.      Current Outpatient Medications   Medication Instructions    amLODIPine (NORVASC) 5 mg, Oral, Daily    diclofenac (VOLTAREN) 50 mg, Oral, 2 Times Daily PRN       The following portions of the patient's history were reviewed and updated as appropriate: allergies, current medications, past family history, past medical history, past social history, past surgical  "history, and problem list.    Objective   Vital Signs:   /78   Pulse 91   Temp 97 °F (36.1 °C)   Ht 152.4 cm (60\")   Wt 48.5 kg (107 lb)   SpO2 98%   BMI 20.90 kg/m²     BP Readings from Last 3 Encounters:   02/13/25 130/78   11/28/24 149/95   01/15/24 (!) 147/110     Wt Readings from Last 3 Encounters:   02/13/25 48.5 kg (107 lb)   01/10/25 50.3 kg (111 lb)   11/28/24 47.9 kg (105 lb 9.6 oz)     BMI is within normal parameters. No other follow-up for BMI required.     Physical Exam  Constitutional:       Appearance: Normal appearance.   HENT:      Head: Normocephalic.      Nose: Nose normal.      Mouth/Throat:      Mouth: Mucous membranes are moist.   Eyes:      Pupils: Pupils are equal, round, and reactive to light.   Cardiovascular:      Rate and Rhythm: Normal rate and regular rhythm.   Pulmonary:      Effort: Pulmonary effort is normal.   Abdominal:      General: Abdomen is flat.   Musculoskeletal:         General: Normal range of motion.      Cervical back: Normal range of motion.   Skin:     General: Skin is warm and dry.   Neurological:      General: No focal deficit present.      Mental Status: She is alert.   Psychiatric:         Mood and Affect: Mood normal.         Thought Content: Thought content normal.            Result Review :   The following data was reviewed by: Gina Hargrove MD on 02/13/2025:           Lab Results   Component Value Date    COVID19 Not Detected 01/15/2024    RSV Not Detected 01/15/2024       Procedures        Assessment and Plan    Diagnoses and all orders for this visit:    1. Annual physical exam (Primary)  -     Comprehensive Metabolic Panel  -     CBC & Differential  -     TSH  -     Lipid Panel    2. Need for hepatitis C screening test  -     Hepatitis C Antibody; Future    3. Need for vaccination    4. Hypertension, unspecified type    5. Need for vaccination with 20-polyvalent pneumococcal conjugate vaccine  -     Pneumococcal Conjugate Vaccine 20-Valent " All    6. Need for Tdap vaccination  -     Tdap Vaccine => 8yo IM (BOOSTRIX/ADACEL)    7. Colon cancer screening  -     Ambulatory Referral For Screening Colonoscopy          Assessment & Plan  1. Annual physical examination.  Her blood pressure readings have been consistently within the range of 110 to 120 systolic, with today's highest reading being 130/78, which is commendable. She has been advised to discontinue regular blood pressure monitoring and only record readings when experiencing symptoms such as headaches. A comprehensive blood work will be conducted today. She has expressed a preference for colonoscopy over Cologuard for colon cancer screening. An appointment for a mammogram has been scheduled for 03/03/2025.    2. Hypertension.  She is currently taking amlodipine. Her blood pressure log shows readings between 110 and 120 systolic, with today's highest being 130/78. She has been advised to discontinue regular blood pressure monitoring and only record readings when experiencing symptoms such as headaches.    3. Health maintenance.  She received both the pneumonia and tetanus vaccines today. A comprehensive blood work will be conducted today. She has expressed a preference for colonoscopy over Cologuard for colon cancer screening. An appointment for a mammogram has been scheduled for 03/03/2025.    Follow-up  The patient will follow up in 3 months.      There are no discontinued medications.       Follow Up   Return in about 3 months (around 5/13/2025).  Patient was given instructions and counseling regarding her condition or for health maintenance advice. Please see specific information pulled into the AVS if appropriate.       Gina Hargrove MD  02/13/25  08:23 EST      Patient or patient representative verbalized consent for the use of Ambient Listening during the visit with  Gina Hargrove MD for chart documentation. 2/13/2025  07:48 EST

## 2025-02-20 ENCOUNTER — OFFICE VISIT (OUTPATIENT)
Dept: FAMILY MEDICINE CLINIC | Facility: CLINIC | Age: 60
End: 2025-02-20
Payer: OTHER GOVERNMENT

## 2025-02-20 VITALS
DIASTOLIC BLOOD PRESSURE: 78 MMHG | WEIGHT: 111 LBS | OXYGEN SATURATION: 96 % | TEMPERATURE: 97.8 F | HEART RATE: 69 BPM | SYSTOLIC BLOOD PRESSURE: 118 MMHG | HEIGHT: 60 IN | BODY MASS INDEX: 21.79 KG/M2

## 2025-02-20 DIAGNOSIS — E11.9 TYPE 2 DIABETES MELLITUS WITHOUT COMPLICATION, WITHOUT LONG-TERM CURRENT USE OF INSULIN: ICD-10-CM

## 2025-02-20 DIAGNOSIS — M62.838 NECK MUSCLE SPASM: ICD-10-CM

## 2025-02-20 DIAGNOSIS — E78.2 MIXED HYPERLIPIDEMIA: ICD-10-CM

## 2025-02-20 DIAGNOSIS — Z09 FOLLOW-UP EXAM: Primary | ICD-10-CM

## 2025-02-20 DIAGNOSIS — I10 ESSENTIAL HYPERTENSION: ICD-10-CM

## 2025-02-20 LAB
ALBUMIN UR-MCNC: <1.2 MG/DL
CREAT UR-MCNC: 11 MG/DL
HBA1C MFR BLD: 6.8 % (ref 4.8–5.6)
MICROALBUMIN/CREAT UR: NORMAL MG/G{CREAT}

## 2025-02-20 PROCEDURE — 83036 HEMOGLOBIN GLYCOSYLATED A1C: CPT | Performed by: NURSE PRACTITIONER

## 2025-02-20 PROCEDURE — 82570 ASSAY OF URINE CREATININE: CPT | Performed by: NURSE PRACTITIONER

## 2025-02-20 PROCEDURE — 36415 COLL VENOUS BLD VENIPUNCTURE: CPT | Performed by: NURSE PRACTITIONER

## 2025-02-20 PROCEDURE — 99214 OFFICE O/P EST MOD 30 MIN: CPT | Performed by: NURSE PRACTITIONER

## 2025-02-20 PROCEDURE — 82043 UR ALBUMIN QUANTITATIVE: CPT | Performed by: NURSE PRACTITIONER

## 2025-02-20 RX ORDER — PEN NEEDLE, DIABETIC 31 GX5/16"
1 NEEDLE, DISPOSABLE MISCELLANEOUS 2 TIMES DAILY
Qty: 180 EACH | Refills: 2 | Status: SHIPPED | OUTPATIENT
Start: 2025-02-20 | End: 2025-05-21

## 2025-02-20 RX ORDER — TIZANIDINE 2 MG/1
2 TABLET ORAL NIGHTLY PRN
Qty: 30 TABLET | Refills: 1 | Status: SHIPPED | OUTPATIENT
Start: 2025-02-20

## 2025-02-20 RX ORDER — ATORVASTATIN CALCIUM 20 MG/1
20 TABLET, FILM COATED ORAL DAILY
Qty: 30 TABLET | Refills: 3 | Status: SHIPPED | OUTPATIENT
Start: 2025-02-20 | End: 2025-02-20

## 2025-02-20 RX ORDER — BLOOD-GLUCOSE METER
1 KIT MISCELLANEOUS AS NEEDED
Qty: 1 EACH | Refills: 0 | Status: SHIPPED | OUTPATIENT
Start: 2025-02-20 | End: 2027-11-16

## 2025-02-20 RX ORDER — LANCETS 28 GAUGE
1 EACH MISCELLANEOUS 2 TIMES DAILY
Qty: 200 EACH | Refills: 1 | Status: SHIPPED | OUTPATIENT
Start: 2025-02-20

## 2025-02-20 RX ORDER — ROSUVASTATIN CALCIUM 20 MG/1
20 TABLET, COATED ORAL DAILY
Qty: 90 TABLET | Refills: 1 | Status: SHIPPED | OUTPATIENT
Start: 2025-02-20

## 2025-02-20 NOTE — PROGRESS NOTES
Chief Complaint  Results (LABWORK)    Subjective        Medical History: has a past medical history of Allergic.     Surgical History: has a past surgical history that includes Subtotal Hysterectomy.     Family History: family history is not on file.     Social History: reports that she quit smoking about 21 years ago. Her smoking use included cigarettes. She has a 6.3 pack-year smoking history. She has never used smokeless tobacco. She reports that she does not currently use alcohol. She reports that she does not use drugs.    Cherelle Seo presents to [facility]    History of Present Illness    History of Present Illness  The patient presents for evaluation of diabetes, hypercholesterolemia, hypertension, dizziness, vomiting, and muscle pain.    She has been informed of her elevated blood glucose levels via [employer] but has no prior history of diabetes. She reports an increase in urinary frequency and expresses concern about the potential need for lifelong medication. Her typical diet includes peanut butter and jelly sandwiches for breakfast, soup, rice, and vegetables for lunch, and rice and green tea for dinner. She primarily consumes water throughout the day, occasionally indulging in Gatorade, although she does not always finish the bottle. She also mentions that she frequently dines at other people's homes, where she consumes high-calorie foods such as noodles. She is currently on antihypertensive medication, which she takes at 3:00 PM daily. She recently refilled her prescription for this medication.    She experiences occasional dizziness upon standing from a lying position, which she attributes to her diet. She also reports infrequent episodes of vomiting, during which she is unable to retain food.    Additionally, she reports experiencing headaches, which she believes may be stress-related.    MEDICATIONS  Current: Blood pressure medicine      Objective   Vital Signs:  "  /78   Pulse 69   Temp 97.8 °F (36.6 °C)   Ht 152.4 cm (60\")   Wt 50.3 kg (111 lb)   SpO2 96%   BMI 21.68 kg/m²       Wt Readings from Last 3 Encounters:   02/20/25 50.3 kg (111 lb)   02/13/25 48.5 kg (107 lb)   01/10/25 50.3 kg (111 lb)        BP Readings from Last 3 Encounters:   02/20/25 118/78   02/13/25 130/78   11/28/24 149/95        BMI is within normal parameters. No other follow-up for BMI required.       Physical Exam  Vitals reviewed.   Constitutional:       General: She is not in acute distress.     Appearance: Normal appearance. She is well-developed and normal weight. She is not ill-appearing.   HENT:      Head: Normocephalic and atraumatic.      Mouth/Throat:      Pharynx: No oropharyngeal exudate.   Eyes:      Conjunctiva/sclera: Conjunctivae normal.      Pupils: Pupils are equal, round, and reactive to light.   Cardiovascular:      Rate and Rhythm: Normal rate and regular rhythm.      Heart sounds: No murmur heard.  Pulmonary:      Effort: Pulmonary effort is normal.      Breath sounds: Normal breath sounds. No wheezing.   Skin:     General: Skin is warm and dry.   Neurological:      Mental Status: She is alert and oriented to person, place, and time.      Cranial Nerves: No cranial nerve deficit.   Psychiatric:         Mood and Affect: Mood and affect normal.         Behavior: Behavior normal.         Thought Content: Thought content normal.         Judgment: Judgment normal.        Result Review :  {The following data was reviewed by ALL Pak on 02/20/2025.  Common labs          2/13/2025    08:00   Common Labs   Glucose 239    BUN 14    Creatinine 0.79    Sodium 137    Potassium 3.8    Chloride 101    Calcium 9.4    Albumin 4.1    Total Bilirubin 0.5    Alkaline Phosphatase 73    AST (SGOT) 21    ALT (SGPT) 13    WBC 4.49    Hemoglobin 14.3    Hematocrit 42.1    Platelets 246    Total Cholesterol 248    Triglycerides 210    HDL Cholesterol 60    LDL Cholesterol  150 "      Data reviewed : Previous Dr. De La Cruz note             Current Outpatient Medications on File Prior to Visit   Medication Sig Dispense Refill    amLODIPine (NORVASC) 5 MG tablet Take 1 tablet by mouth Daily. 30 tablet 5    diclofenac (VOLTAREN) 50 MG EC tablet Take 1 tablet by mouth 2 (Two) Times a Day As Needed (as neded for pain) for up to 30 days. (Patient not taking: Reported on 2/20/2025) 60 tablet 0     No current facility-administered medications on file prior to visit.        Assessment and Plan  Diagnoses and all orders for this visit:    1. Follow-up exam (Primary)    2. Essential hypertension  -     Hemoglobin A1c  -     Microalbumin / Creatinine Urine Ratio - Urine, Clean Catch    3. Type 2 diabetes mellitus without complication, without long-term current use of insulin  -     Hemoglobin A1c  -     Alcohol Swabs (Alcohol Prep) pads; Use 1 Pad 2 (Two) Times a Day for 90 days.  Dispense: 180 each; Refill: 2  -     glucose blood test strip; BID fasting and 2hr after largest meal E11.9  Dispense: 180 each; Refill: 2  -     glucose monitor monitoring kit; Use 1 each As Needed (BID). E11.9 BID fasting and 2hr after largest meal  Dispense: 1 each; Refill: 0  -     Lancets 28G Thin misc; Use 1 Device 2 (Two) Times a Day.  Dispense: 200 each; Refill: 1  -     Microalbumin / Creatinine Urine Ratio - Urine, Clean Catch  -     metFORMIN (GLUCOPHAGE) 500 MG tablet; Take 1 tablet by mouth 2 (Two) Times a Day With Meals.  Dispense: 60 tablet; Refill: 3    4. Mixed hyperlipidemia  -     Hemoglobin A1c  -     Microalbumin / Creatinine Urine Ratio - Urine, Clean Catch    5. Neck muscle spasm    Other orders  -     Discontinue: atorvastatin (Lipitor) 20 MG tablet; Take 1 tablet by mouth Daily.  Dispense: 30 tablet; Refill: 3  -     tiZANidine (ZANAFLEX) 2 MG tablet; Take 1 tablet by mouth At Night As Needed for Muscle Spasms.  Dispense: 30 tablet; Refill: 1  -     rosuvastatin (CRESTOR) 20 MG tablet; Take 1 tablet by  mouth Daily.  Dispense: 90 tablet; Refill: 1        Assessment & Plan  1. Diabetes Mellitus.  Her glucose levels have been consistently elevated, necessitating the initiation of pharmacological intervention. The potential side effects of metformin, including gastrointestinal disturbances, were discussed. She was advised to monitor her blood glucose levels at home and maintain a log for future reference. Dietary modifications were also recommended. A prescription for metformin 500 mg, to be taken twice daily, was provided. A glucometer will be sent to her pharmacy for home monitoring. A hemoglobin A1c test will be conducted today to assess her average glucose levels over the past 90 days.    2. Hypercholesterolemia.  Her LDL cholesterol level is significantly elevated at 150 mg/dL. The importance of managing both diabetes and cholesterol to reduce the risk of cardiac events was explained. A prescription for rosuvastatin was provided, with instructions to take it at night. She was advised to avoid foods high in cholesterol and to incorporate more fruits and vegetables into her diet.    3. Hypertension.  Her blood pressure is currently well-controlled with her existing medication regimen. She was advised to continue her current antihypertensive medications and monitor her blood pressure regularly.    4. Dizziness.  She reported experiencing dizziness, particularly when getting up quickly. This could be related to her glucose levels or blood pressure. She was advised to monitor her symptoms and report any significant changes.    5. Vomiting.  She reported occasional episodes of vomiting, which could be related to reflux. She was advised to monitor her symptoms and consider dietary changes to reduce reflux. Medication for reflux can be considered if symptoms persist.    6. Muscle Pain.  She reported muscle pain and expressed interest in trying a muscle relaxer. A prescription for a low-dose muscle relaxer was provided,  with instructions to take it as needed. She was also given stretching exercises to help alleviate the pain.    7. Health Maintenance.  She was advised to get the shingles vaccine, as it is recommended for individuals over the age of 50. She can receive this vaccine at her pharmacy.    Follow-up  The patient will follow up in 1 month.      Follow Up   Return in about 1 month (around 3/20/2025) for Recheck.  Patient was given instructions and counseling regarding her condition or for health maintenance advice. Please see specific information pulled into the AVS if appropriate.       Part of this note may be electronic transcription/translation of spoken language to printed text using the Dragon dictation system    Patient or patient representative verbalized consent for the use of Ambient Listening during the visit with  ALL Pak for chart documentation. 2/20/2025  10:08 EST

## 2025-03-03 ENCOUNTER — HOSPITAL ENCOUNTER (OUTPATIENT)
Dept: MAMMOGRAPHY | Facility: HOSPITAL | Age: 60
Discharge: HOME | End: 2025-03-03
Admitting: STUDENT IN AN ORGANIZED HEALTH CARE EDUCATION/TRAINING PROGRAM
Payer: OTHER GOVERNMENT

## 2025-03-03 DIAGNOSIS — Z12.31 SCREENING MAMMOGRAM FOR BREAST CANCER: ICD-10-CM

## 2025-03-03 PROCEDURE — 77067 SCR MAMMO BI INCL CAD: CPT

## 2025-03-03 PROCEDURE — 77063 BREAST TOMOSYNTHESIS BI: CPT

## 2025-03-25 ENCOUNTER — OFFICE VISIT (OUTPATIENT)
Dept: FAMILY MEDICINE CLINIC | Facility: CLINIC | Age: 60
End: 2025-03-25
Payer: OTHER GOVERNMENT

## 2025-03-25 VITALS
OXYGEN SATURATION: 99 % | WEIGHT: 107 LBS | BODY MASS INDEX: 21.01 KG/M2 | HEART RATE: 71 BPM | TEMPERATURE: 97.1 F | HEIGHT: 60 IN

## 2025-03-25 DIAGNOSIS — Z11.59 NEED FOR HEPATITIS C SCREENING TEST: ICD-10-CM

## 2025-03-25 DIAGNOSIS — R42 DIZZINESS: ICD-10-CM

## 2025-03-25 DIAGNOSIS — Z09 FOLLOW-UP EXAM: ICD-10-CM

## 2025-03-25 DIAGNOSIS — E11.9 TYPE 2 DIABETES MELLITUS WITHOUT COMPLICATION, WITHOUT LONG-TERM CURRENT USE OF INSULIN: Primary | ICD-10-CM

## 2025-03-25 PROBLEM — N85.2 ENLARGED UTERUS: Status: ACTIVE | Noted: 2025-03-25

## 2025-03-25 LAB
ALBUMIN SERPL-MCNC: 4.8 G/DL (ref 3.5–5.2)
ALBUMIN/GLOB SERPL: 1.8 G/DL
ALP SERPL-CCNC: 78 U/L (ref 39–117)
ALT SERPL W P-5'-P-CCNC: 21 U/L (ref 1–33)
ANION GAP SERPL CALCULATED.3IONS-SCNC: 13.3 MMOL/L (ref 5–15)
AST SERPL-CCNC: 29 U/L (ref 1–32)
BASOPHILS # BLD AUTO: 0.04 10*3/MM3 (ref 0–0.2)
BASOPHILS NFR BLD AUTO: 0.6 % (ref 0–1.5)
BILIRUB SERPL-MCNC: 0.5 MG/DL (ref 0–1.2)
BUN SERPL-MCNC: 7 MG/DL (ref 8–23)
BUN/CREAT SERPL: 8.3 (ref 7–25)
CALCIUM SPEC-SCNC: 10.4 MG/DL (ref 8.6–10.5)
CHLORIDE SERPL-SCNC: 102 MMOL/L (ref 98–107)
CO2 SERPL-SCNC: 25.7 MMOL/L (ref 22–29)
CREAT SERPL-MCNC: 0.84 MG/DL (ref 0.57–1)
DEPRECATED RDW RBC AUTO: 38.8 FL (ref 37–54)
EGFRCR SERPLBLD CKD-EPI 2021: 79.7 ML/MIN/1.73
EOSINOPHIL # BLD AUTO: 0.08 10*3/MM3 (ref 0–0.4)
EOSINOPHIL NFR BLD AUTO: 1.3 % (ref 0.3–6.2)
ERYTHROCYTE [DISTWIDTH] IN BLOOD BY AUTOMATED COUNT: 12.2 % (ref 12.3–15.4)
GLOBULIN UR ELPH-MCNC: 2.7 GM/DL
GLUCOSE SERPL-MCNC: 102 MG/DL (ref 65–99)
HBA1C MFR BLD: 6.4 % (ref 4.8–5.6)
HCT VFR BLD AUTO: 44.5 % (ref 34–46.6)
HCV AB SER QL: NORMAL
HGB BLD-MCNC: 15 G/DL (ref 12–15.9)
IMM GRANULOCYTES # BLD AUTO: 0.01 10*3/MM3 (ref 0–0.05)
IMM GRANULOCYTES NFR BLD AUTO: 0.2 % (ref 0–0.5)
LYMPHOCYTES # BLD AUTO: 2 10*3/MM3 (ref 0.7–3.1)
LYMPHOCYTES NFR BLD AUTO: 31.5 % (ref 19.6–45.3)
MCH RBC QN AUTO: 29.6 PG (ref 26.6–33)
MCHC RBC AUTO-ENTMCNC: 33.7 G/DL (ref 31.5–35.7)
MCV RBC AUTO: 87.8 FL (ref 79–97)
MONOCYTES # BLD AUTO: 0.4 10*3/MM3 (ref 0.1–0.9)
MONOCYTES NFR BLD AUTO: 6.3 % (ref 5–12)
NEUTROPHILS NFR BLD AUTO: 3.82 10*3/MM3 (ref 1.7–7)
NEUTROPHILS NFR BLD AUTO: 60.1 % (ref 42.7–76)
NRBC BLD AUTO-RTO: 0 /100 WBC (ref 0–0.2)
PLATELET # BLD AUTO: 251 10*3/MM3 (ref 140–450)
PMV BLD AUTO: 10.8 FL (ref 6–12)
POTASSIUM SERPL-SCNC: 3.6 MMOL/L (ref 3.5–5.2)
PROT SERPL-MCNC: 7.5 G/DL (ref 6–8.5)
RBC # BLD AUTO: 5.07 10*6/MM3 (ref 3.77–5.28)
SODIUM SERPL-SCNC: 141 MMOL/L (ref 136–145)
WBC NRBC COR # BLD AUTO: 6.35 10*3/MM3 (ref 3.4–10.8)

## 2025-03-25 PROCEDURE — 86803 HEPATITIS C AB TEST: CPT | Performed by: STUDENT IN AN ORGANIZED HEALTH CARE EDUCATION/TRAINING PROGRAM

## 2025-03-25 PROCEDURE — 83036 HEMOGLOBIN GLYCOSYLATED A1C: CPT | Performed by: STUDENT IN AN ORGANIZED HEALTH CARE EDUCATION/TRAINING PROGRAM

## 2025-03-25 PROCEDURE — 85025 COMPLETE CBC W/AUTO DIFF WBC: CPT | Performed by: STUDENT IN AN ORGANIZED HEALTH CARE EDUCATION/TRAINING PROGRAM

## 2025-03-25 PROCEDURE — 80053 COMPREHEN METABOLIC PANEL: CPT | Performed by: STUDENT IN AN ORGANIZED HEALTH CARE EDUCATION/TRAINING PROGRAM

## 2025-03-25 RX ORDER — ROSUVASTATIN CALCIUM 10 MG/1
10 TABLET, COATED ORAL NIGHTLY
Qty: 90 TABLET | Refills: 5 | Status: SHIPPED | OUTPATIENT
Start: 2025-03-25

## 2025-03-25 NOTE — PROGRESS NOTES
Chief Complaint  Hyperlipidemia (Pt was started on rosuvastatin sates that she has been gassy since starting on medication she also stated that she has been experiencing dizziness )    Arun          Cherelle Seo presents to [facility]  Hyperlipidemia          History of Present Illness  The patient presents for evaluation of dizziness, diabetes mellitus, and hyperlipidemia.    She reports experiencing intermittent dizziness, which she attributes to her dietary intake. The onset of these symptoms was noted on Monday morning, following a meal consumed on Sunday. She has observed a correlation between her dizziness and elevated blood glucose levels, particularly after consuming rice paper. She also mentions that her blood pressure tends to be more often low than high. She is not experiencing any chest pain or shortness of breath. She recalls two previous episodes of dizziness, one associated with noodle consumption and the other with sushi intake. She describes the dizziness as causing a sensation of slowness but does not report any syncope.    She is currently on metformin for diabetes management, administered twice daily. She has no prior history of diabetes and reports no family history of the disease. She has sufficient test strips for blood glucose monitoring.    She expresses concern about her cholesterol medication, Crestor, which she believes is causing gastrointestinal discomfort. She notes that this side effect is not present when she takes the medication in the morning, but it becomes bothersome when taken at night.    FAMILY HISTORY  She reports no family history of diabetes.    MEDICATIONS  Metformin, Crestor, Norvasc      Current Outpatient Medications   Medication Instructions    Alcohol Swabs (Alcohol Prep) pads 1 Pad, Not Applicable, 2 Times Daily    amLODIPine (NORVASC) 5 mg, Oral, Daily    glucose blood test strip BID fasting and 2hr after largest meal E11.9  "   glucose monitor monitoring kit 1 each, Not Applicable, As Needed, E11.9 BID fasting and 2hr after largest meal    Lancets 28G Thin misc 1 Device, Not Applicable, 2 Times Daily    metFORMIN (GLUCOPHAGE) 500 mg, Oral, 2 Times Daily With Meals    rosuvastatin (CRESTOR) 10 mg, Oral, Nightly    tiZANidine (ZANAFLEX) 2 mg, Oral, Nightly PRN       The following portions of the patient's history were reviewed and updated as appropriate: allergies, current medications, past family history, past medical history, past social history, past surgical history, and problem list.    Objective   Vital Signs:   Pulse 71   Temp 97.1 °F (36.2 °C)   Ht 152.4 cm (60\")   Wt 48.5 kg (107 lb)   SpO2 99%   BMI 20.90 kg/m²     BP Readings from Last 3 Encounters:   02/20/25 118/78   02/13/25 130/78   11/28/24 149/95     Wt Readings from Last 3 Encounters:   03/25/25 48.5 kg (107 lb)   02/20/25 50.3 kg (111 lb)   02/13/25 48.5 kg (107 lb)     BMI is within normal parameters. No other follow-up for BMI required.     Physical Exam  Constitutional:       Appearance: Normal appearance.   HENT:      Head: Normocephalic.      Nose: Nose normal.      Mouth/Throat:      Mouth: Mucous membranes are moist.   Eyes:      Pupils: Pupils are equal, round, and reactive to light.   Cardiovascular:      Rate and Rhythm: Normal rate and regular rhythm.   Pulmonary:      Effort: Pulmonary effort is normal.   Abdominal:      General: Abdomen is flat.   Musculoskeletal:         General: Normal range of motion.      Cervical back: Normal range of motion.   Skin:     General: Skin is warm and dry.   Neurological:      General: No focal deficit present.      Mental Status: She is alert.   Psychiatric:         Mood and Affect: Mood normal.         Thought Content: Thought content normal.              Result Review :   The following data was reviewed by: Gina Hargrove MD on 03/25/2025:  Common labs          2/13/2025    08:00 2/20/2025    08:53   Common Labs "   Glucose 239     BUN 14     Creatinine 0.79     Sodium 137     Potassium 3.8     Chloride 101     Calcium 9.4     Albumin 4.1     Total Bilirubin 0.5     Alkaline Phosphatase 73     AST (SGOT) 21     ALT (SGPT) 13     WBC 4.49     Hemoglobin 14.3     Hematocrit 42.1     Platelets 246     Total Cholesterol 248     Triglycerides 210     HDL Cholesterol 60     LDL Cholesterol  150     Hemoglobin A1C  6.80    Microalbumin, Urine  <1.2             Lab Results   Component Value Date    COVID19 Not Detected 01/15/2024    RSV Not Detected 01/15/2024       Procedures        Assessment and Plan    Diagnoses and all orders for this visit:    1. Type 2 diabetes mellitus without complication, without long-term current use of insulin (Primary)  -     CBC w AUTO Differential; Future  -     Comprehensive metabolic panel; Future  -     Hemoglobin A1c; Future  -     metFORMIN (GLUCOPHAGE) 500 MG tablet; Take 1 tablet by mouth 2 (Two) Times a Day With Meals.  Dispense: 180 tablet; Refill: 3  -     CBC w AUTO Differential  -     Comprehensive metabolic panel  -     Hemoglobin A1c    2. Need for hepatitis C screening test  -     Hepatitis C Antibody; Future  -     Hepatitis C Antibody    3. Follow-up exam    4. Dizziness    Other orders  -     rosuvastatin (CRESTOR) 10 MG tablet; Take 1 tablet by mouth Every Night.  Dispense: 90 tablet; Refill: 5          Assessment & Plan  1. Dizziness.  Her dizziness is likely related to fluctuations in blood glucose levels, which can cause dizziness when levels rise or fall rapidly. Blood work will be conducted today to rule out other potential causes.    2. Diabetes Mellitus.  Her A1c levels are elevated but not excessively high, indicating a need for treatment. She is currently taking metformin twice a day. She is advised to monitor her blood glucose levels before breakfast for the next visit, 5 days a week, and whenever she remembers. If she consumes a large meal the previous day, she should  check her fasting blood glucose levels the following morning.    3. Hyperlipidemia.  Her cholesterol level was recorded at 150 during the last visit, which is significantly high and increases her risk of heart attack or stroke. The dosage of Crestor will be reduced from 20 mg to 10 mg to see if it alleviates her gassiness. If there is no improvement within 2 weeks, she should contact the office, and an alternative medication will be considered.    Follow-up  The patient will follow up in 3 months.      Medications Discontinued During This Encounter   Medication Reason    rosuvastatin (CRESTOR) 20 MG tablet     metFORMIN (GLUCOPHAGE) 500 MG tablet Reorder          Follow Up   Return in about 4 weeks (around 4/22/2025).  Patient was given instructions and counseling regarding her condition or for health maintenance advice. Please see specific information pulled into the AVS if appropriate.       Gina Hargrove MD  03/25/25  10:49 EDT      Patient or patient representative verbalized consent for the use of Ambient Listening during the visit with  Gina Hargrove MD for chart documentation. 3/25/2025  08:00 EDT

## 2025-04-25 ENCOUNTER — OFFICE VISIT (OUTPATIENT)
Dept: GASTROENTEROLOGY | Facility: CLINIC | Age: 60
End: 2025-04-25
Payer: OTHER GOVERNMENT

## 2025-04-25 VITALS
HEIGHT: 60 IN | OXYGEN SATURATION: 100 % | HEART RATE: 77 BPM | WEIGHT: 107.8 LBS | BODY MASS INDEX: 21.17 KG/M2 | DIASTOLIC BLOOD PRESSURE: 75 MMHG | SYSTOLIC BLOOD PRESSURE: 131 MMHG

## 2025-04-25 DIAGNOSIS — Z12.11 ENCOUNTER FOR SCREENING FOR MALIGNANT NEOPLASM OF COLON: ICD-10-CM

## 2025-04-25 DIAGNOSIS — R09.A2 GLOBUS SENSATION: Primary | ICD-10-CM

## 2025-04-25 RX ORDER — PEG-3350, SODIUM SULFATE, SODIUM CHLORIDE, POTASSIUM CHLORIDE, SODIUM ASCORBATE AND ASCORBIC ACID 7.5-2.691G
1000 KIT ORAL EVERY 12 HOURS
Qty: 2000 ML | Refills: 0 | Status: SHIPPED | OUTPATIENT
Start: 2025-04-25

## 2025-04-25 NOTE — PROGRESS NOTES
Chief Complaint  Colon Cancer Screening and Difficulty Swallowing    Cherelle Seo is a 59 y.o. female who presents to [facility] on referral from Gina Hargrove MD for a gastroenterology evaluation of dysphagia and colon cancer screening.        History of Present Illness  New patient presents to the office for dysphagia and colon cancer screening. Previously having dysphagia and globus sensation but this has improved. Denies heartburn, nausea, vomiting, and abdominal pain. Denies change in bowel habits, melena, and hematochezia. Denies unintentional weight loss. Unknown family history.       Past Medical History:   Diagnosis Date    Allergic        Past Surgical History:   Procedure Laterality Date    SUBTOTAL HYSTERECTOMY           Current Outpatient Medications:     Alcohol Swabs (Alcohol Prep) pads, Use 1 Pad 2 (Two) Times a Day for 90 days., Disp: 180 each, Rfl: 2    amLODIPine (NORVASC) 5 MG tablet, Take 1 tablet by mouth Daily., Disp: 30 tablet, Rfl: 5    glucose blood test strip, BID fasting and 2hr after largest meal E11.9, Disp: 180 each, Rfl: 2    glucose monitor monitoring kit, Use 1 each As Needed (BID). E11.9 BID fasting and 2hr after largest meal, Disp: 1 each, Rfl: 0    Lancets 28G Thin misc, Use 1 Device 2 (Two) Times a Day., Disp: 200 each, Rfl: 1    metFORMIN (GLUCOPHAGE) 500 MG tablet, Take 1 tablet by mouth 2 (Two) Times a Day With Meals., Disp: 180 tablet, Rfl: 3    rosuvastatin (CRESTOR) 10 MG tablet, Take 1 tablet by mouth Every Night., Disp: 90 tablet, Rfl: 5    tiZANidine (ZANAFLEX) 2 MG tablet, Take 1 tablet by mouth At Night As Needed for Muscle Spasms., Disp: 30 tablet, Rfl: 1    PEG-KCl-NaCl-NaSulf-Na Asc-C (MoviPrep) 100 g reconstituted solution powder, Take 1,000 mL by mouth Every 12 (Twelve) Hours., Disp: 2000 mL, Rfl: 0     Allergies   Allergen Reactions    Bactrim [Sulfamethoxazole-Trimethoprim] Unknown - Low Severity    Benadryl  "[Diphenhydramine] Unknown - Low Severity       Family History   Problem Relation Age of Onset    Colon cancer Neg Hx         Social History     Social History Narrative    Not on file       Immunization:  Immunization History   Administered Date(s) Administered    Pneumococcal Conjugate 20-Valent (PCV20) 02/13/2025    Tdap 02/13/2025        Objective     Vital Signs:   /75 (BP Location: Left arm, Patient Position: Sitting, Cuff Size: Adult)   Pulse 77   Ht 152.4 cm (60\")   Wt 48.9 kg (107 lb 12.8 oz)   SpO2 100%   BMI 21.05 kg/m²       Physical Exam  Constitutional:       Appearance: Normal appearance. She is normal weight.   HENT:      Head: Normocephalic and atraumatic.      Nose: Nose normal.   Pulmonary:      Effort: Pulmonary effort is normal.   Skin:     General: Skin is warm and dry.   Neurological:      Mental Status: She is alert and oriented to person, place, and time. Mental status is at baseline.   Psychiatric:         Mood and Affect: Mood normal.         Behavior: Behavior normal.         Thought Content: Thought content normal.         Judgment: Judgment normal.         Result Review :     CBC w/diff          2/13/2025    08:00 3/25/2025    08:10   CBC w/Diff   WBC 4.49  6.35    RBC 4.80  5.07    Hemoglobin 14.3  15.0    Hematocrit 42.1  44.5    MCV 87.7  87.8    MCH 29.8  29.6    MCHC 34.0  33.7    RDW 11.8  12.2    Platelets 246  251    Neutrophil Rel % 52.1  60.1    Immature Granulocyte Rel % 0.2  0.2    Lymphocyte Rel % 38.5  31.5    Monocyte Rel % 6.5  6.3    Eosinophil Rel % 1.6  1.3    Basophil Rel % 1.1  0.6      CMP          2/13/2025    08:00 3/25/2025    08:10   CMP   Glucose 239  102    BUN 14  7    Creatinine 0.79  0.84    EGFR 85.8  79.7    Sodium 137  141    Potassium 3.8  3.6    Chloride 101  102    Calcium 9.4  10.4    Total Protein 6.6  7.5    Albumin 4.1  4.8    Globulin 2.5  2.7    Total Bilirubin 0.5  0.5    Alkaline Phosphatase 73  78    AST (SGOT) 21  29    ALT (SGPT) " 13  21    Albumin/Globulin Ratio 1.6  1.8    BUN/Creatinine Ratio 17.7  8.3    Anion Gap 11.7  13.3                    Assessment and Plan    Diagnoses and all orders for this visit:    1. Globus sensation (Primary)  -     Case Request; Standing  -     Implement Anesthesia orders day of procedure.; Standing  -     Follow Anesthesia Guidelines / Protocol; Future  -     Verify NPO; Standing  -     Verify bowel prep was successful; Standing  -     Give tap water enema if bowel prep was insufficient; Standing  -     Case Request    2. Encounter for screening for malignant neoplasm of colon  -     Case Request; Standing  -     Implement Anesthesia orders day of procedure.; Standing  -     Follow Anesthesia Guidelines / Protocol; Future  -     Verify NPO; Standing  -     Verify bowel prep was successful; Standing  -     Give tap water enema if bowel prep was insufficient; Standing  -     Case Request    Other orders  -     PEG-KCl-NaCl-NaSulf-Na Asc-C (MoviPrep) 100 g reconstituted solution powder; Take 1,000 mL by mouth Every 12 (Twelve) Hours.  Dispense: 2000 mL; Refill: 0    Denies cardiopulmonary history  EGD/COLONOSCOPY Surgical Risk and Benefits: Possible risk/complications, benefits, and alternatives to surgical or invasive procedure have been explained to patient and/or legal guardian. Risks include bleeding, infection, and perforation. Patient has been evaluated and can tolerate anesthesia and/or sedation. Risk, benefits, and alternatives to anesthesia and sedation have been explained to patient and/or legal guardian.     Follow Up   No follow-ups on file.  Patient was given instructions and counseling regarding her condition or for health maintenance advice. Please see specific information pulled into the AVS if appropriate.

## 2025-05-27 NOTE — PAT
Reviewed the following with patient.    Arrival time of 1100.    Must have  over 18 for transportation home post procedure. Come to the [facility], entrance C.     Education provided on laxative administration; bowel prep to be taken in two doses. Reviewed diet instructions for day prior to procedure. Only plain, unflavored water after midnight until two hours prior to arrival time.     Do not take any diabetes medications on the day of the procedure. Instead bring all prescribed medication and inhalers to the hospital the morning of the procedure. May take any nebulizer treatments or inhalers the morning of the procedure.     Pt verbalized understanding of instructions.

## 2025-05-30 ENCOUNTER — ANESTHESIA EVENT (OUTPATIENT)
Dept: GASTROENTEROLOGY | Facility: HOSPITAL | Age: 60
End: 2025-05-30
Payer: OTHER GOVERNMENT

## 2025-05-30 RX ORDER — SODIUM CHLORIDE, SODIUM LACTATE, POTASSIUM CHLORIDE, CALCIUM CHLORIDE 600; 310; 30; 20 MG/100ML; MG/100ML; MG/100ML; MG/100ML
30 INJECTION, SOLUTION INTRAVENOUS CONTINUOUS
Status: CANCELLED | OUTPATIENT
Start: 2025-06-02 | End: 2025-06-02

## 2025-05-30 NOTE — ANESTHESIA PREPROCEDURE EVALUATION
Anesthesia Evaluation     Patient summary reviewed   NPO Solid Status: > 8 hours  NPO Liquid Status: > 2 hours           Airway   Mallampati: I  TM distance: >3 FB  Neck ROM: full  No difficulty expected  Dental      Comment: Missing back teeth. Nothing loose       Pulmonary - normal exam   (+) a smoker (quit 2004) Former, cigarettes,  Cardiovascular - normal exam  Exercise tolerance: good (4-7 METS)    ECG reviewed    (+) hypertension well controlled less than 2 medications    ROS comment: HEART RATE= 89  bpm  RR Interval= 672  ms  [place] Interval= 117  ms  P Horizontal Axis= 1  deg  P Front Axis= 39  deg  QRSD Interval= 87  ms  QT Interval= 364  ms  QTcB= 444  ms  QRS Axis= 69  deg  T Wave Axis= -11  deg  - BORDERLINE ECG -  Sinus rhythm  Borderline short [place] interval  Borderline repolarization abnormality  No previous ECG available for comparison  Electronically Signed By: Parish Almaraz ([facility]) 16-Jan-2024 08:51:46  Date and Time of Study: 2024-01-15 17:08:58          Neuro/Psych- negative ROS  GI/Hepatic/Renal/Endo    (+) diabetes mellitus type 2 well controlled    Musculoskeletal (-) negative ROS    Abdominal  - normal exam   Substance History - negative use     OB/GYN negative ob/gyn ROS         Other - negative ROS       ROS/Med Hx Other: Took blood pressure meds this morning     BG 67- 1/2 amp D50 ordered. Will recheck BG after giving.                Anesthesia Plan    ASA 2     general   total IV anesthesia  intravenous induction     Anesthetic plan, risks, benefits, and alternatives have been provided, discussed and informed consent has been obtained with: patient.  Pre-procedure education provided  Plan discussed with CRNA.    CODE STATUS:

## 2025-06-02 ENCOUNTER — HOSPITAL ENCOUNTER (OUTPATIENT)
Facility: HOSPITAL | Age: 60
Setting detail: HOSPITAL OUTPATIENT SURGERY
Discharge: HOME | End: 2025-06-02
Attending: INTERNAL MEDICINE | Admitting: INTERNAL MEDICINE
Payer: OTHER GOVERNMENT

## 2025-06-02 ENCOUNTER — ANESTHESIA (OUTPATIENT)
Dept: GASTROENTEROLOGY | Facility: HOSPITAL | Age: 60
End: 2025-06-02
Payer: OTHER GOVERNMENT

## 2025-06-02 VITALS
WEIGHT: 101.41 LBS | SYSTOLIC BLOOD PRESSURE: 116 MMHG | RESPIRATION RATE: 18 BRPM | HEART RATE: 71 BPM | BODY MASS INDEX: 19.81 KG/M2 | TEMPERATURE: 96.3 F | DIASTOLIC BLOOD PRESSURE: 84 MMHG | OXYGEN SATURATION: 98 %

## 2025-06-02 DIAGNOSIS — R09.A2 GLOBUS SENSATION: ICD-10-CM

## 2025-06-02 DIAGNOSIS — Z12.11 ENCOUNTER FOR SCREENING FOR MALIGNANT NEOPLASM OF COLON: ICD-10-CM

## 2025-06-02 LAB
GLUCOSE BLDC GLUCOMTR-MCNC: 132 MG/DL (ref 70–99)
GLUCOSE BLDC GLUCOMTR-MCNC: 195 MG/DL (ref 70–99)
GLUCOSE BLDC GLUCOMTR-MCNC: 67 MG/DL (ref 70–99)

## 2025-06-02 PROCEDURE — 45385 COLONOSCOPY W/LESION REMOVAL: CPT | Performed by: INTERNAL MEDICINE

## 2025-06-02 PROCEDURE — 88305 TISSUE EXAM BY PATHOLOGIST: CPT | Performed by: INTERNAL MEDICINE

## 2025-06-02 PROCEDURE — 25810000003 LACTATED RINGERS PER 1000 ML: Performed by: NURSE ANESTHETIST, CERTIFIED REGISTERED

## 2025-06-02 PROCEDURE — 43239 EGD BIOPSY SINGLE/MULTIPLE: CPT | Performed by: INTERNAL MEDICINE

## 2025-06-02 PROCEDURE — 82948 REAGENT STRIP/BLOOD GLUCOSE: CPT

## 2025-06-02 PROCEDURE — 25010000002 LIDOCAINE PF 2% 2 % SOLUTION: Performed by: NURSE ANESTHETIST, CERTIFIED REGISTERED

## 2025-06-02 PROCEDURE — 25010000002 PROPOFOL 10 MG/ML EMULSION: Performed by: NURSE ANESTHETIST, CERTIFIED REGISTERED

## 2025-06-02 RX ORDER — PROPOFOL 10 MG/ML
VIAL (ML) INTRAVENOUS AS NEEDED
Status: DISCONTINUED | OUTPATIENT
Start: 2025-06-02 | End: 2025-06-02 | Stop reason: SURG

## 2025-06-02 RX ORDER — DEXTROSE MONOHYDRATE 25 G/50ML
25 INJECTION, SOLUTION INTRAVENOUS AS NEEDED
Status: DISCONTINUED | OUTPATIENT
Start: 2025-06-02 | End: 2025-06-02 | Stop reason: HOSPADM

## 2025-06-02 RX ORDER — SODIUM CHLORIDE, SODIUM LACTATE, POTASSIUM CHLORIDE, CALCIUM CHLORIDE 600; 310; 30; 20 MG/100ML; MG/100ML; MG/100ML; MG/100ML
INJECTION, SOLUTION INTRAVENOUS CONTINUOUS PRN
Status: DISCONTINUED | OUTPATIENT
Start: 2025-06-02 | End: 2025-06-02 | Stop reason: SURG

## 2025-06-02 RX ORDER — DEXTROSE MONOHYDRATE 25 G/50ML
12.5 INJECTION, SOLUTION INTRAVENOUS AS NEEDED
Status: DISCONTINUED | OUTPATIENT
Start: 2025-06-02 | End: 2025-06-02

## 2025-06-02 RX ORDER — LIDOCAINE HYDROCHLORIDE 20 MG/ML
INJECTION, SOLUTION EPIDURAL; INFILTRATION; INTRACAUDAL; PERINEURAL AS NEEDED
Status: DISCONTINUED | OUTPATIENT
Start: 2025-06-02 | End: 2025-06-02 | Stop reason: SURG

## 2025-06-02 RX ADMIN — LIDOCAINE HYDROCHLORIDE 40 MG: 20 INJECTION, SOLUTION INTRAVENOUS at 12:25

## 2025-06-02 RX ADMIN — DEXTROSE MONOHYDRATE 25 G: 25 INJECTION, SOLUTION INTRAVENOUS at 11:46

## 2025-06-02 RX ADMIN — PROPOFOL 200 MCG/KG/MIN: 10 INJECTION, EMULSION INTRAVENOUS at 12:25

## 2025-06-02 RX ADMIN — SODIUM CHLORIDE, POTASSIUM CHLORIDE, SODIUM LACTATE AND CALCIUM CHLORIDE: 600; 310; 30; 20 INJECTION, SOLUTION INTRAVENOUS at 12:25

## 2025-06-02 RX ADMIN — PROPOFOL 50 MG: 10 INJECTION, EMULSION INTRAVENOUS at 12:25

## 2025-06-02 NOTE — ANESTHESIA POSTPROCEDURE EVALUATION
Patient: Cherelle Seo    Procedure Summary       Date: 06/02/25 Room / Location: [facility] / [facility]    Anesthesia Start: 1224 Anesthesia Stop: 1303    Procedures:       ESOPHAGOGASTRODUODENOSCOPY with biopsies      COLONOSCOPY with cold snare polypectomy Diagnosis:       Globus sensation      Encounter for screening for malignant neoplasm of colon      (Globus sensation [R09.A2])      (Encounter for screening for malignant neoplasm of colon [Z12.11])    Surgeons: Roberto Carlos Jimenez MD Provider: Genet Steel CRNA    Anesthesia Type: general ASA Status: 2            Anesthesia Type: general    Vitals  Vitals Value Taken Time   BP 92/66 06/02/25 13:06   Temp 35.7 °C (96.3 °F) 06/02/25 13:00   Pulse 77 06/02/25 13:06   Resp 18 06/02/25 13:00   SpO2 96 % 06/02/25 13:06   Vitals shown include unfiled device data.        Post Anesthesia Care and Evaluation    Patient location during evaluation: bedside  Patient participation: complete - patient participated  Level of consciousness: awake  Pain management: adequate    Airway patency: patent  Anesthetic complications: No anesthetic complications  PONV Status: controlled  Cardiovascular status: acceptable and stable  Respiratory status: acceptable

## 2025-06-02 NOTE — H&P
Pre Procedure History & Physical    Chief Complaint:   Dysphagia, globus sensation, screening dysphagia, globus sensation, screening for colon cancer    Subjective     HPI:   Dysphagia, screening    Past Medical History:   Past Medical History:   Diagnosis Date    Allergic     Arthritis     Diabetes mellitus     Elevated cholesterol     Hypertension        Past Surgical History:  Past Surgical History:   Procedure Laterality Date    SUBTOTAL HYSTERECTOMY         Family History:  Family History   Problem Relation Age of Onset    Colon cancer Neg Hx        Social History:   reports that she quit smoking about 21 years ago. Her smoking use included cigarettes. She has a 6.3 pack-year smoking history. She has been exposed to tobacco smoke. She has never used smokeless tobacco. She reports that she does not currently use alcohol. She reports that she does not use drugs.    Medications:   Medications Prior to Admission   Medication Sig Dispense Refill Last Dose/Taking    amLODIPine (NORVASC) 5 MG tablet Take 1 tablet by mouth Daily. 30 tablet 5 6/2/2025    metFORMIN (GLUCOPHAGE) 500 MG tablet Take 1 tablet by mouth 2 (Two) Times a Day With Meals. 180 tablet 3 6/1/2025    rosuvastatin (CRESTOR) 10 MG tablet Take 1 tablet by mouth Every Night. 90 tablet 5 6/1/2025    glucose blood test strip BID fasting and 2hr after largest meal E11.9 180 each 2     glucose monitor monitoring kit Use 1 each As Needed (BID). E11.9 BID fasting and 2hr after largest meal 1 each 0     Lancets 28G Thin misc Use 1 Device 2 (Two) Times a Day. 200 each 1     tiZANidine (ZANAFLEX) 2 MG tablet Take 1 tablet by mouth At Night As Needed for Muscle Spasms. 30 tablet 1        Allergies:  Bactrim [sulfamethoxazole-trimethoprim] and Benadryl [diphenhydramine]        Objective     Blood pressure 128/92, pulse 87, temperature 98.2 °F (36.8 °C), temperature source Temporal, resp. rate 14, weight 46 kg (101 lb 6.6 oz), SpO2 100%.    Physical Exam    Constitutional: Pt is oriented to person, place, and time and well-developed, well-nourished, and in no distress.   Mouth/Throat: Oropharynx is clear and moist.   Neck: Normal range of motion.   Cardiovascular: Normal rate, regular rhythm and normal heart sounds.    Pulmonary/Chest: Effort normal and breath sounds normal.   Abdominal: Soft. Nontender  Skin: Skin is warm and dry.   Psychiatric: Mood, memory, affect and judgment normal.     Assessment & Plan     Diagnosis:  Dysphagia, globus sensation, colon cancer screening    Anticipated Surgical Procedure:  EGD colonoscopy    The risks, benefits, and alternatives of this procedure have been discussed with the patient or the responsible party- the patient understands and agrees to proceed.

## 2025-06-03 ENCOUNTER — RESULTS FOLLOW-UP (OUTPATIENT)
Dept: GASTROENTEROLOGY | Facility: HOSPITAL | Age: 60
End: 2025-06-03
Payer: OTHER GOVERNMENT

## 2025-06-03 DIAGNOSIS — A04.8 H. PYLORI INFECTION: Primary | ICD-10-CM

## 2025-06-04 LAB
CYTO UR: NORMAL
LAB AP CASE REPORT: NORMAL
LAB AP CLINICAL INFORMATION: NORMAL
PATH REPORT.FINAL DX SPEC: NORMAL
PATH REPORT.GROSS SPEC: NORMAL

## 2025-06-04 RX ORDER — AMOXICILLIN 500 MG/1
1000 CAPSULE ORAL 2 TIMES DAILY
Qty: 56 CAPSULE | Refills: 0 | Status: SHIPPED | OUTPATIENT
Start: 2025-06-04 | End: 2025-06-18

## 2025-06-04 RX ORDER — CLARITHROMYCIN 500 MG/1
500 TABLET ORAL 2 TIMES DAILY
Qty: 28 TABLET | Refills: 0 | Status: SHIPPED | OUTPATIENT
Start: 2025-06-04 | End: 2025-06-18

## 2025-06-04 RX ORDER — PANTOPRAZOLE SODIUM 40 MG/1
40 TABLET, DELAYED RELEASE ORAL DAILY
Qty: 14 TABLET | Refills: 0 | Status: SHIPPED | OUTPATIENT
Start: 2025-06-04 | End: 2025-06-18

## 2025-06-05 NOTE — TELEPHONE ENCOUNTER
Spoke to patient and informed of ALL Caceres result note and recommendations. Verified patient understanding.    Educated on intestinal metaplasia:  metaplasia is the transition of cells into a different type of cell, in this case we see intestinal cells in the esophagus/stomach.  This is a normal cell; however, it is not in its normal place.      When found in the stomach, this is thought to be cause by an injury/trauma to the stomach.  For instance, smoking, alcohol or NSAID use.  We recommend to avoid these things.    Intestinal metaplasia can be associated with an increased risk of cancer.  If the cell becomes abnormal, it is a very slow process of cellular change.  General recommendations to decrease risk of cancer would be smoking cessation, abstain from alcohol, and avoid NSAIDs.    Educated on H. Pylori bacterial infection and importance of completing medication as prescribed.  Provided medication details.     Educated on need for H. Pylori breath test 4 weeks after completion of medication regimen to insure eradication of bacteria.  Reviewed breath test instructions.    H. Pylori education and detailed H. Pylori medication and breath test instructions sent via [employer] and [email or web address].    Advised patient to reach out to our office with any GI needs.    Mailed letter to patient.  Confirmed PCP and faxed letter.    Care Gap updated:  5 year colon recall

## 2025-06-06 NOTE — TELEPHONE ENCOUNTER
Patient called to review medications.  Confirmed she was able to  all three medications today.  Reviewed how best to take.  Patient verbalized understanding.

## 2025-06-09 ENCOUNTER — OFFICE VISIT (OUTPATIENT)
Dept: FAMILY MEDICINE CLINIC | Facility: CLINIC | Age: 60
End: 2025-06-09
Payer: OTHER GOVERNMENT

## 2025-06-09 VITALS
OXYGEN SATURATION: 97 % | BODY MASS INDEX: 20.81 KG/M2 | HEART RATE: 80 BPM | HEIGHT: 60 IN | TEMPERATURE: 97 F | WEIGHT: 106 LBS

## 2025-06-09 DIAGNOSIS — E11.9 TYPE 2 DIABETES MELLITUS WITHOUT COMPLICATION, WITHOUT LONG-TERM CURRENT USE OF INSULIN: ICD-10-CM

## 2025-06-09 DIAGNOSIS — E78.2 MIXED HYPERLIPIDEMIA: ICD-10-CM

## 2025-06-09 DIAGNOSIS — I10 HYPERTENSION, UNSPECIFIED TYPE: ICD-10-CM

## 2025-06-09 DIAGNOSIS — I10 ESSENTIAL HYPERTENSION: Primary | ICD-10-CM

## 2025-06-09 PROCEDURE — 99214 OFFICE O/P EST MOD 30 MIN: CPT | Performed by: STUDENT IN AN ORGANIZED HEALTH CARE EDUCATION/TRAINING PROGRAM

## 2025-06-09 RX ORDER — AMLODIPINE BESYLATE 5 MG/1
5 TABLET ORAL DAILY
Qty: 90 TABLET | Refills: 2 | Status: SHIPPED | OUTPATIENT
Start: 2025-06-09

## 2025-06-09 RX ORDER — BLOOD-GLUCOSE METER
KIT MISCELLANEOUS
COMMUNITY
Start: 2025-02-20

## 2025-06-09 RX ORDER — ROSUVASTATIN CALCIUM 10 MG/1
10 TABLET, COATED ORAL NIGHTLY
Qty: 90 TABLET | Refills: 5 | Status: SHIPPED | OUTPATIENT
Start: 2025-06-09

## 2025-06-09 NOTE — PROGRESS NOTES
Chief Complaint  GI Problem (Pt stated that she had colonoscopy and was told she has bacteria in her stomach and was asking if we did the testing for that, unsure of what the test actually is.)    Subjective          Sol ONUR Seo presents to [facility]  History of Present Illness      History of Present Illness  The patient presents for evaluation of H. pylori infection, diabetes mellitus, and blood pressure management.    She reports an improvement in her overall health status, attributing this to the medication regimen she has been following for the past 14 days. She is currently on a course of three medications, including penicillin and Protonix, which she administers on an empty stomach each morning. She has not yet completed the full course of treatment. Her symptoms of fatigue and abdominal pain have significantly subsided, and she no longer experiences stomach pain throughout the day.    She monitors her blood glucose levels at home, with readings typically ranging from 67 to 115. During a recent colonoscopy, her blood glucose level was found to be low, likely due to fasting. She did not experience any adverse effects from this low reading. Following the administration of intravenous glucose, her blood glucose level increased to between 132 and 195. She is seeking refills for her diabetes medications as she only has a 10-day supply remaining.    She also requests refills for her amlodipine and Crestor prescriptions. She does not experience any episodes of dizziness. Her appetite remains robust, and she engages in regular physical activity, specifically walking. She also monitors her blood pressure at home.      Current Outpatient Medications   Medication Instructions   • amLODIPine (NORVASC) 5 mg, Oral, Daily   • amoxicillin (AMOXIL) 1,000 mg, Oral, 2 Times Daily   • Blood Glucose Monitoring Suppl (FreeStyle Freedom Lite) w/Device kit    • clarithromycin (BIAXIN) 500 mg,  "Oral, 2 Times Daily   • glucose blood test strip BID fasting and 2hr after largest meal E11.9   • glucose monitor monitoring kit 1 each, Not Applicable, As Needed, E11.9 BID fasting and 2hr after largest meal   • Lancets 28G Thin misc 1 Device, Not Applicable, 2 Times Daily   • metFORMIN (GLUCOPHAGE) 500 mg, Oral, 2 Times Daily With Meals   • pantoprazole (PROTONIX) 40 mg, Oral, Daily   • rosuvastatin (CRESTOR) 10 mg, Oral, Nightly   • tiZANidine (ZANAFLEX) 2 mg, Oral, Nightly PRN       The following portions of the patient's history were reviewed and updated as appropriate: allergies, current medications, past family history, past medical history, past social history, past surgical history, and problem list.    Objective   Vital Signs:   Pulse 80   Temp 97 °F (36.1 °C)   Ht 152.4 cm (60\")   Wt 48.1 kg (106 lb)   SpO2 97%   BMI 20.70 kg/m²     BP Readings from Last 3 Encounters:   06/02/25 116/84   04/25/25 131/75   02/20/25 118/78     Wt Readings from Last 3 Encounters:   06/09/25 48.1 kg (106 lb)   06/02/25 46 kg (101 lb 6.6 oz)   04/25/25 48.9 kg (107 lb 12.8 oz)     BMI is within normal parameters. No other follow-up for BMI required.     Physical Exam       Result Review :   The following data was reviewed by: Gina Hargrove MD on 06/09/2025:  Common labs          2/13/2025    08:00 2/20/2025    08:53 3/25/2025    08:10   Common Labs   Glucose 239   102    BUN 14   7    Creatinine 0.79   0.84    Sodium 137   141    Potassium 3.8   3.6    Chloride 101   102    Calcium 9.4   10.4    Albumin 4.1   4.8    Total Bilirubin 0.5   0.5    Alkaline Phosphatase 73   78    AST (SGOT) 21   29    ALT (SGPT) 13   21    WBC 4.49   6.35    Hemoglobin 14.3   15.0    Hematocrit 42.1   44.5    Platelets 246   251    Total Cholesterol 248      Triglycerides 210      HDL Cholesterol 60      LDL Cholesterol  150      Hemoglobin A1C  6.80  6.40    Microalbumin, Urine  <1.2              Lab Results   Component Value Date    " COVID19 Not Detected 01/15/2024    RSV Not Detected 01/15/2024       Procedures        Assessment and Plan    Diagnoses and all orders for this visit:    1. Essential hypertension (Primary)    2. Type 2 diabetes mellitus without complication, without long-term current use of insulin  -     metFORMIN (GLUCOPHAGE) 500 MG tablet; Take 1 tablet by mouth 2 (Two) Times a Day With Meals.  Dispense: 180 tablet; Refill: 3    3. Hypertension, unspecified type  -     amLODIPine (NORVASC) 5 MG tablet; Take 1 tablet by mouth Daily.  Dispense: 90 tablet; Refill: 2    4. Mixed hyperlipidemia  -     rosuvastatin (CRESTOR) 10 MG tablet; Take 1 tablet by mouth Every Night.  Dispense: 90 tablet; Refill: 5          Assessment & Plan  1. Helicobacter pylori infection.  - She has been taking the prescribed triple therapy regimen, including penicillin and Protonix, for 14 days.  - Reports significant improvement in symptoms, including the resolution of stomach pain and gas.  - A repeat breath test is scheduled for 07/21/2025 to confirm eradication of the infection.  - She is advised to come to the office for the breath test in the morning and follow the fasting protocol.    2. Diabetes mellitus.  - Her A1c was 6.4 in 03/2025.  - Reports that her blood glucose levels at home range from 67 to 115.  - She is advised to continue monitoring her blood glucose levels and maintain a balanced diet.  - A prescription refill for metformin has been provided.    3. Blood pressure management.  - She is advised to continue monitoring her blood pressure at home.  - Reports no dizziness and is eating well.  - Prescriptions for amlodipine and Crestor have been refilled.  - Encouraged to maintain a healthy lifestyle, including regular physical activity such as walking.    Follow-up  - The patient will follow up in 3 months.      Medications Discontinued During This Encounter   Medication Reason   • amLODIPine (NORVASC) 5 MG tablet Reorder   • rosuvastatin  (CRESTOR) 10 MG tablet Reorder   • metFORMIN (GLUCOPHAGE) 500 MG tablet Reorder          Follow Up   No follow-ups on file.  Patient was given instructions and counseling regarding her condition or for health maintenance advice. Please see specific information pulled into the AVS if appropriate.       Gina Hargrove MD  06/09/25  07:37 EDT      Patient or patient representative verbalized consent for the use of Ambient Listening during the visit with  Gina Hargrove MD for chart documentation. 6/9/2025  07:36 EDT

## 2025-06-20 DIAGNOSIS — E11.9 TYPE 2 DIABETES MELLITUS WITHOUT COMPLICATION, WITHOUT LONG-TERM CURRENT USE OF INSULIN: ICD-10-CM

## 2025-06-20 NOTE — TELEPHONE ENCOUNTER
Caller: Cherelle Seo    Relationship: Self    Best call back number: [phone]     Requested Prescriptions:   Requested Prescriptions     Pending Prescriptions Disp Refills    metFORMIN (GLUCOPHAGE) 500 MG tablet 180 tablet 3     Sig: Take 1 tablet by mouth 2 (Two) Times a Day With Meals.        Pharmacy where request should be sent: [place] [phone] [place] [phone]      Last office visit with prescribing clinician: 6/9/2025   Last telemedicine visit with prescribing clinician: Visit date not found   Next office visit with prescribing clinician: 9/9/2025     Additional details provided by patient: LESS THAN THREE DAY SUPPLY ON HAND.     Does the patient have less than a 3 day supply:  [x] Yes  [] No      Margaret Maradiaga Rep   06/20/25 11:54 EDT

## 2025-06-23 ENCOUNTER — TELEPHONE (OUTPATIENT)
Dept: FAMILY MEDICINE CLINIC | Facility: CLINIC | Age: 60
End: 2025-06-23
Payer: OTHER GOVERNMENT

## 2025-06-23 DIAGNOSIS — I10 HYPERTENSION, UNSPECIFIED TYPE: ICD-10-CM

## 2025-06-23 DIAGNOSIS — E11.9 TYPE 2 DIABETES MELLITUS WITHOUT COMPLICATION, WITHOUT LONG-TERM CURRENT USE OF INSULIN: ICD-10-CM

## 2025-06-23 RX ORDER — AMLODIPINE BESYLATE 5 MG/1
5 TABLET ORAL DAILY
Qty: 90 TABLET | Refills: 2 | Status: SHIPPED | OUTPATIENT
Start: 2025-06-23

## 2025-07-07 ENCOUNTER — CLINICAL SUPPORT (OUTPATIENT)
Dept: FAMILY MEDICINE CLINIC | Facility: CLINIC | Age: 60
End: 2025-07-07
Payer: OTHER GOVERNMENT

## 2025-07-07 DIAGNOSIS — A04.8 H. PYLORI INFECTION: ICD-10-CM

## 2025-07-07 PROCEDURE — 83013 H PYLORI (C-13) BREATH: CPT

## 2025-07-08 LAB — UREA BREATH TEST QL: NEGATIVE

## 2025-07-21 ENCOUNTER — CLINICAL SUPPORT (OUTPATIENT)
Dept: FAMILY MEDICINE CLINIC | Facility: CLINIC | Age: 60
End: 2025-07-21
Payer: OTHER GOVERNMENT

## 2025-07-21 DIAGNOSIS — E11.9 TYPE 2 DIABETES MELLITUS WITHOUT COMPLICATION, WITHOUT LONG-TERM CURRENT USE OF INSULIN: Primary | ICD-10-CM

## 2025-07-21 DIAGNOSIS — E11.9 TYPE 2 DIABETES MELLITUS WITHOUT COMPLICATION, WITHOUT LONG-TERM CURRENT USE OF INSULIN: ICD-10-CM

## 2025-07-21 LAB
ALBUMIN SERPL-MCNC: 4.2 G/DL (ref 3.5–5.2)
ALBUMIN/GLOB SERPL: 2.2 G/DL
ALP SERPL-CCNC: 62 U/L (ref 39–117)
ALT SERPL W P-5'-P-CCNC: 23 U/L (ref 1–33)
ANION GAP SERPL CALCULATED.3IONS-SCNC: 13.7 MMOL/L (ref 5–15)
AST SERPL-CCNC: 32 U/L (ref 1–32)
BILIRUB SERPL-MCNC: 0.5 MG/DL (ref 0–1.2)
BUN SERPL-MCNC: 10 MG/DL (ref 8–23)
BUN/CREAT SERPL: 15.2 (ref 7–25)
CALCIUM SPEC-SCNC: 9.4 MG/DL (ref 8.6–10.5)
CHLORIDE SERPL-SCNC: 105 MMOL/L (ref 98–107)
CO2 SERPL-SCNC: 22.3 MMOL/L (ref 22–29)
CREAT SERPL-MCNC: 0.66 MG/DL (ref 0.57–1)
EGFRCR SERPLBLD CKD-EPI 2021: 100.6 ML/MIN/1.73
GLOBULIN UR ELPH-MCNC: 1.9 GM/DL
GLUCOSE SERPL-MCNC: 169 MG/DL (ref 65–99)
HBA1C MFR BLD: 6.8 % (ref 4.8–5.6)
POTASSIUM SERPL-SCNC: 4 MMOL/L (ref 3.5–5.2)
PROT SERPL-MCNC: 6.1 G/DL (ref 6–8.5)
SODIUM SERPL-SCNC: 141 MMOL/L (ref 136–145)

## 2025-07-21 PROCEDURE — 80053 COMPREHEN METABOLIC PANEL: CPT | Performed by: STUDENT IN AN ORGANIZED HEALTH CARE EDUCATION/TRAINING PROGRAM

## 2025-07-21 PROCEDURE — 83036 HEMOGLOBIN GLYCOSYLATED A1C: CPT | Performed by: STUDENT IN AN ORGANIZED HEALTH CARE EDUCATION/TRAINING PROGRAM

## 2025-07-21 PROCEDURE — 36415 COLL VENOUS BLD VENIPUNCTURE: CPT | Performed by: STUDENT IN AN ORGANIZED HEALTH CARE EDUCATION/TRAINING PROGRAM

## (undated) DEVICE — SINGLE-USE BIOPSY FORCEPS: Brand: RADIAL JAW 4

## (undated) DEVICE — BLCK/BITE BLOX WO/DENTL/RIM W/STRAP 54F

## (undated) DEVICE — CONN JET HYDRA H20 AUXILIARY DISP

## (undated) DEVICE — SNAR E/S POLYP SNAREMASTER OVL/10MM 2.8X2300MM YEL

## (undated) DEVICE — LINER SURG CANSTR SXN S/RIGD 1500CC

## (undated) DEVICE — SOL IRRG H2O PL/BG 1000ML STRL

## (undated) DEVICE — THE STERILE LIGHT HANDLE COVER IS USED WITH STERIS SURGICAL LIGHTING AND VISUALIZATION SYSTEMS.

## (undated) DEVICE — SOLIDIFIER LIQLOC PLS 1500CC BT

## (undated) DEVICE — Device

## (undated) DEVICE — THE SINGLE USE ETRAP – POLYP TRAP IS USED FOR SUCTION RETRIEVAL OF ENDOSCOPICALLY REMOVED POLYPS.: Brand: ETRAP

## (undated) DEVICE — DEFENDO AIR WATER SUCTION AND BIOPSY VALVE KIT FOR  OLYMPUS: Brand: DEFENDO AIR/WATER/SUCTION AND BIOPSY VALVE